# Patient Record
Sex: FEMALE | Race: BLACK OR AFRICAN AMERICAN | Employment: FULL TIME | URBAN - METROPOLITAN AREA
[De-identification: names, ages, dates, MRNs, and addresses within clinical notes are randomized per-mention and may not be internally consistent; named-entity substitution may affect disease eponyms.]

---

## 2018-06-18 ENCOUNTER — OFFICE VISIT (OUTPATIENT)
Dept: FAMILY MEDICINE CLINIC | Age: 58
End: 2018-06-18

## 2018-06-18 VITALS
HEART RATE: 57 BPM | HEIGHT: 63 IN | DIASTOLIC BLOOD PRESSURE: 72 MMHG | SYSTOLIC BLOOD PRESSURE: 126 MMHG | TEMPERATURE: 98.9 F | OXYGEN SATURATION: 96 % | BODY MASS INDEX: 31.89 KG/M2 | RESPIRATION RATE: 18 BRPM | WEIGHT: 180 LBS

## 2018-06-18 DIAGNOSIS — R06.02 SHORTNESS OF BREATH: Primary | ICD-10-CM

## 2018-06-18 DIAGNOSIS — I10 ESSENTIAL HYPERTENSION: ICD-10-CM

## 2018-06-18 DIAGNOSIS — M54.2 NECK PAIN: ICD-10-CM

## 2018-06-18 RX ORDER — ZINC GLUCONATE 10 MG
LOZENGE ORAL
COMMUNITY
End: 2019-11-14 | Stop reason: ALTCHOICE

## 2018-06-18 RX ORDER — BISMUTH SUBSALICYLATE 262 MG
1 TABLET,CHEWABLE ORAL DAILY
COMMUNITY

## 2018-06-18 NOTE — MR AVS SNAPSHOT
[address] 
[phone] Patient: Tom Thompson MRN: [id number] :1960 Visit Information Date & Time Provider Department Dept. Phone Encounter #  
 2018 11:00 AM Vinod Salguero  [facility] [phone] [id number] Upcoming Health Maintenance Date Due Hepatitis C Screening 1960 DTaP/Tdap/Td series (1 - Tdap) 1982 FOBT Q 1 YEAR AGE 50-75 2011 BREAST CANCER SCRN MAMMOGRAM 7/3/2018* Influenza Age 5 to Adult 2018 PAP AKA CERVICAL CYTOLOGY 2021 *Topic was postponed. The date shown is not the original due date. Allergies as of 2018  Review Complete On: 2018 By: Vinod Salguero NP No Known Allergies Current Immunizations  Never Reviewed Name Date PPD 2011 Tdap 2017 Not reviewed this visit You Were Diagnosed With   
  
 Codes Comments Shortness of breath    -  Primary ICD-10-CM: R06.02 
ICD-9-CM: 786.05 Neck pain     ICD-10-CM: M54.2 ICD-9-CM: 723.1 Vitals BP Pulse Temp Resp Height(growth percentile) Weight(growth percentile) 126/72 (BP 1 Location: Left arm, BP Patient Position: Sitting) (!) 57 98.9 °F (37.2 °C) (Oral) 18 5' 3\" (1.6 m) 180 lb (81.6 kg) SpO2 BMI OB Status Smoking Status 96% 31.89 kg/m2 Hysterectomy Never Smoker BMI and BSA Data Body Mass Index Body Surface Area  
 31.89 kg/m 2 1.9 m 2 Preferred Pharmacy Pharmacy Name Phone [facility] [address] [facility], [address] [facility],[address] Floor [phone] Your Updated Medication List  
  
   
This list is accurate as of 18 11:51 AM.  Always use your most recent med list.  
  
  
  
  
 EXCEDRIN MIGRAINE PO Take  by mouth as needed. hydroCHLOROthiazide 25 mg tablet Commonly known as:  HYDRODIURIL Take 1 Tab by mouth daily. magnesium 250 mg Tab Take  by mouth.  
  
 multivitamin tablet Commonly known as:  ONE A DAY Take 1 Tab by mouth daily. PROBIOTIC 4X 10-15 mg Tbec Generic drug:  B.infantis-B.ani-B.long-B.bifi Take  by mouth. To-Do List   
 06/18/2018 Imaging:  XR CHEST PA LAT Patient Instructions Healthy Upper Back: Exercises Your Care Instructions Here are some examples of exercises for your upper back. Start each exercise slowly. Ease off the exercise if you start to have pain. Your doctor or physical therapist will tell you when you can start these exercises and which ones will work best for you. How to do the exercises Lower neck and upper back stretch 1. Stretch your arms out in front of your body. Clasp one hand on top of your other hand. 2. Gently reach out so that you feel your shoulder blades stretching away from each other. 3. Gently bend your head forward. 4. Hold for 15 to 30 seconds. 5. Repeat 2 to 4 times. Midback stretch If you have knee pain, do not do this exercise. 1. Kneel on the floor, and sit back on your ankles. 2. Lean forward, place your hands on the floor, and stretch your arms out in front of you. Rest your head between your arms. 3. Gently push your chest toward the floor, reaching as far in front of you as possible. 4. Hold for 15 to 30 seconds. 5. Repeat 2 to 4 times. Shoulder rolls 1. Sit comfortably with your feet shoulder-width apart. You can also do this exercise while standing. 2. Roll your shoulders up, then back, and then down in a smooth, circular motion. 3. Repeat 2 to 4 times. Wall push-up 1. Stand against a wall with your feet about 12 to 24 inches back from the wall. If you feel any pain when you do this exercise, stand closer to the wall. 2. Place your hands on the wall slightly wider apart than your shoulders, and lean forward. 3. Gently lean your body toward the wall.  Then push back to your starting position. Keep the motion smooth and controlled. 4. Repeat 8 to 12 times. Resisted shoulder blade squeeze For this exercise, you will need elastic exercise material, such as surgical tubing or Thera-Band. 1. Sit or stand, holding the band in both hands in front of you. Keep your elbows close to your sides, bent at a 90-degree angle. Your palms should face up. 2. Squeeze your shoulder blades together, and move your arms to the outside, stretching the band. Be sure to keep your elbows at your sides while you do this. 3. Relax. 4. Repeat 8 to 12 times. Resisted rows For this exercise, you will need elastic exercise material, such as surgical tubing or Thera-Band. 1. Put the band around a solid object, such as a bedpost, at about waist level. Hold one end of the band in each hand. 2. With your elbows at your sides and bent to 90 degrees, pull the band back to move your shoulder blades toward each other. Return to the starting position. 3. Repeat 8 to 12 times. Follow-up care is a key part of your treatment and safety. Be sure to make and go to all appointments, and call your doctor if you are having problems. It's also a good idea to know your test results and keep a list of the medicines you take. Where can you learn more? Go to http://eladio-esther.info/. Enter H409 in the search box to learn more about \"Healthy Upper Back: Exercises. \" Current as of: March 21, 2017 Content Version: 11.4 © 8322-1789 Healthwise, Codeanywhere. Care instructions adapted under license by [employer] (which disclaims liability or warranty for this information). If you have questions about a medical condition or this instruction, always ask your healthcare professional. [address] any warranty or liability for your use of this information. Introducing [facility] & HE[facility]!    
 [facility] introduces BeautyStat.[email or web address] patient portal. Now you can access parts of your medical record, email your doctor's office, and request medication refills online. 1. In your internet browser, go to [email or web address] 2. Click on the First Time User? Click Here link in the Sign In box. You will see the New Member Sign Up page. 3. Enter your WP Fail-Safe Access Code exactly as it appears below. You will not need to use this code after youve completed the sign-up process. If you do not sign up before the expiration date, you must request a new code. · WP Fail-Safe Access Code: [email or web address] Expires: 9/16/2018 11:51 AM 
 
4. Enter the last four digits of your Social Security Number (xxxx) and Date of Birth (mm/dd/yyyy) as indicated and click Submit. You will be taken to the next sign-up page. 5. Create a WP Fail-Safe ID. This will be your WP Fail-Safe login ID and cannot be changed, so think of one that is secure and easy to remember. 6. Create a WP Fail-Safe password. You can change your password at any time. 7. Enter your Password Reset Question and Answer. This can be used at a later time if you forget your password. 8. Enter your e-mail address. You will receive e-mail notification when new information is available in [address] 9. Click Sign Up. You can now view and download portions of your medical record. 10. Click the Download Summary menu link to download a portable copy of your medical information. If you have questions, please visit the Frequently Asked Questions section of the WP Fail-Safe website. Remember, WP Fail-Safe is NOT to be used for urgent needs. For medical emergencies, dial 911. Now available from your iPhone and Android! Please provide this summary of care documentation to your next provider. Your primary care clinician is listed as Amaris Muñiz. If you have any questions after today's visit, please call [phone].

## 2018-06-18 NOTE — PROGRESS NOTES
HISTORY OF PRESENT ILLNESS  Barby Bronson is a 65 y.o. female. HPI  New patient to establish care. Reports she recently had CPE with labs this year with former PCP. Reports no significant abnormality. History of HTN. Adhering to medication regimen. C/o intermittent right side neck pain. Denies any recent injury or unusual/strenuous activity. Aggravated by right rotation. C/o intermittent SOB over the past few years. Sx occur randomly. No history of asthma, not association with exercise. No cough, fever. Review of chart shows she had chest xray done here 2 years ago revealing some RUL interstitial changes. Feels sx are slightly improving at this time. Past Medical History:   Diagnosis Date    Headache(784.0)     migraine/tension    Other and unspecified hyperlipidemia 5/09    high CRP    Unspecified essential hypertension      Patient Active Problem List   Diagnosis Code    Migraine G43.909    Mixed hyperlipidemia E78.2    Essential hypertension I10     Current Outpatient Prescriptions   Medication Sig    multivitamin (ONE A DAY) tablet Take 1 Tab by mouth daily.  magnesium 250 mg tab Take  by mouth.  B.infantis-B.ani-B.long-B.bifi (PROBIOTIC 4X) 10-15 mg TbEC Take  by mouth.  hydrochlorothiazide (HYDRODIURIL) 25 mg tablet Take 1 Tab by mouth daily.  ASPIRIN/ACETAMINOPHEN/CAFFEINE (EXCEDRIN MIGRAINE PO) Take  by mouth as needed. No current facility-administered medications for this visit. Social History   Substance Use Topics    Smoking status: Never Smoker    Smokeless tobacco: Never Used    Alcohol use No      Comment: rare     Visit Vitals    /72 (BP 1 Location: Left arm, BP Patient Position: Sitting)    Pulse (!) 57    Temp 98.9 °F (37.2 °C) (Oral)    Resp 18    Ht 5' 3\" (1.6 m)    Wt 180 lb (81.6 kg)    SpO2 96%    BMI 31.89 kg/m2     Review of Systems   Constitutional: Negative for chills, fever, malaise/fatigue and weight loss.    Respiratory: Positive for shortness of breath. Negative for cough, hemoptysis, sputum production and wheezing. Cardiovascular: Negative for chest pain, palpitations and leg swelling. Musculoskeletal: Positive for neck pain. All other systems reviewed and are negative. Physical Exam   Constitutional: No distress. Overweight. Neck: Neck supple. Cardiovascular: Normal rate, regular rhythm and normal heart sounds. Pulmonary/Chest: Effort normal and breath sounds normal. She has no wheezes. She has no rales. Abdominal: Soft. Bowel sounds are normal.   Musculoskeletal: She exhibits no edema. Cervical back: She exhibits tenderness (right cervical paraspinals). She exhibits normal range of motion. Lymphadenopathy:     She has no cervical adenopathy. Neurological: She is alert. Skin: Skin is warm and dry. Psychiatric: She has a normal mood and affect. ASSESSMENT and PLAN  Diagnoses and all orders for this visit:    1. Shortness of breath  -     XR CHEST PA LAT; Future  Repeat xray normal. Will monitor. Follow up if sx worsen or persist.  Recommend walking x 20 minutes daily. 2. Neck pain  Stretches as directed, handout given. Trial massage therapy. 3. BMI 31.0-31.9,adult  Weight loss recommendations given. 4. Essential hypertension  Well controlled. Follow up 6 months or sooner prn.

## 2018-06-18 NOTE — PROGRESS NOTES
Chief Complaint   Patient presents with    New Patient    Shortness of Breath     1. Have you been to the ER, urgent care clinic since your last visit? Hospitalized since your last visit? No    2. Have you seen or consulted any other health care providers outside of the [address] [place] [address] since your last visit? Include any pap smears or colon screening.  No

## 2018-06-18 NOTE — PATIENT INSTRUCTIONS
Healthy Upper Back: Exercises  Your Care Instructions  Here are some examples of exercises for your upper back. Start each exercise slowly. Ease off the exercise if you start to have pain. Your doctor or physical therapist will tell you when you can start these exercises and which ones will work best for you. How to do the exercises  Lower neck and upper back stretch    1. Stretch your arms out in front of your body. Clasp one hand on top of your other hand. 2. Gently reach out so that you feel your shoulder blades stretching away from each other. 3. Gently bend your head forward. 4. Hold for 15 to 30 seconds. 5. Repeat 2 to 4 times. Midback stretch    If you have knee pain, do not do this exercise. 1. Kneel on the floor, and sit back on your ankles. 2. Lean forward, place your hands on the floor, and stretch your arms out in front of you. Rest your head between your arms. 3. Gently push your chest toward the floor, reaching as far in front of you as possible. 4. Hold for 15 to 30 seconds. 5. Repeat 2 to 4 times. Shoulder rolls    1. Sit comfortably with your feet shoulder-width apart. You can also do this exercise while standing. 2. Roll your shoulders up, then back, and then down in a smooth, circular motion. 3. Repeat 2 to 4 times. Wall push-up    1. Stand against a wall with your feet about 12 to 24 inches back from the wall. If you feel any pain when you do this exercise, stand closer to the wall. 2. Place your hands on the wall slightly wider apart than your shoulders, and lean forward. 3. Gently lean your body toward the wall. Then push back to your starting position. Keep the motion smooth and controlled. 4. Repeat 8 to 12 times. Resisted shoulder blade squeeze    For this exercise, you will need elastic exercise material, such as surgical tubing or Thera-Band. 1. Sit or stand, holding the band in both hands in front of you.  Keep your elbows close to your sides, bent at a 90-degree angle. Your palms should face up. 2. Squeeze your shoulder blades together, and move your arms to the outside, stretching the band. Be sure to keep your elbows at your sides while you do this. 3. Relax. 4. Repeat 8 to 12 times. Resisted rows    For this exercise, you will need elastic exercise material, such as surgical tubing or Thera-Band. 1. Put the band around a solid object, such as a bedpost, at about waist level. Hold one end of the band in each hand. 2. With your elbows at your sides and bent to 90 degrees, pull the band back to move your shoulder blades toward each other. Return to the starting position. 3. Repeat 8 to 12 times. Follow-up care is a key part of your treatment and safety. Be sure to make and go to all appointments, and call your doctor if you are having problems. It's also a good idea to know your test results and keep a list of the medicines you take. Where can you learn more? Go to http://eladio-esther.info/. Enter O679 in the search box to learn more about \"Healthy Upper Back: Exercises. \"  Current as of: March 21, 2017  Content Version: 11.4  © 0419-5010 Healthwise, Incorporated. Care instructions adapted under license by [employer] (which disclaims liability or warranty for this information). If you have questions about a medical condition or this instruction, always ask your healthcare professional. [place] [address] any warranty or liability for your use of this information.

## 2018-07-09 ENCOUNTER — TELEPHONE (OUTPATIENT)
Dept: FAMILY MEDICINE CLINIC | Age: 58
End: 2018-07-09

## 2018-07-16 ENCOUNTER — OFFICE VISIT (OUTPATIENT)
Dept: FAMILY MEDICINE CLINIC | Age: 58
End: 2018-07-16

## 2018-07-16 VITALS
SYSTOLIC BLOOD PRESSURE: 124 MMHG | HEART RATE: 60 BPM | OXYGEN SATURATION: 96 % | BODY MASS INDEX: 32 KG/M2 | TEMPERATURE: 98 F | HEIGHT: 63 IN | WEIGHT: 180.6 LBS | RESPIRATION RATE: 16 BRPM | DIASTOLIC BLOOD PRESSURE: 74 MMHG

## 2018-07-16 DIAGNOSIS — I10 ESSENTIAL HYPERTENSION: ICD-10-CM

## 2018-07-16 DIAGNOSIS — E78.2 MIXED HYPERLIPIDEMIA: ICD-10-CM

## 2018-07-16 DIAGNOSIS — E66.9 OBESITY (BMI 30.0-34.9): ICD-10-CM

## 2018-07-16 DIAGNOSIS — M54.2 NECK PAIN: Primary | ICD-10-CM

## 2018-07-16 RX ORDER — METHOCARBAMOL 500 MG/1
500 TABLET, FILM COATED ORAL 4 TIMES DAILY
Qty: 30 TAB | Refills: 0 | Status: SHIPPED | OUTPATIENT
Start: 2018-07-16 | End: 2019-01-28 | Stop reason: ALTCHOICE
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
## 2018-07-16 NOTE — LETTER
NOTIFICATION RETURN TO WORK / SCHOOL 
 
7/16/2018 10:46 AM 
 
Ms. Tiffanie Thornton Yosefda. [address] [address] To Whom It May Concern: 
 
Tiffanie Thornton is currently under the care of [facility]. No heavy lifting for two weeks until next ain resolved If there are questions or concerns please have the patient contact our office. Sincerely, Mariela Hoyt NP

## 2018-07-16 NOTE — LETTER
NOTIFICATION RETURN TO WORK / SCHOOL 
 
7/16/2018 10:49 AM 
 
Ms. Christal Hendricks Usama. [address] [address] To Whom It May Concern: 
 
Christal Hendricks is currently under the care of [facility]. She will avoid heavy lifting x 2 weeks If there are questions or concerns please have the patient contact our office. Sincerely, Magdalene Hawkins NP

## 2018-07-16 NOTE — MR AVS SNAPSHOT
[facility] Moreno 
 
 
 [address] [facility] [address] [place] [address] 
[phone] Patient: Douglas Suresh MRN: [id number] :1960 Visit Information Date & Time Provider Department Dept. Phone Encounter #  
 2018 10:15 AM [facility], [address] [phone] [id number] Upcoming Health Maintenance Date Due Hepatitis C Screening 1961 FOBT Q 1 YEAR AGE 50-75 2011 BREAST CANCER SCRN MAMMOGRAM 2014 Influenza Age 5 to Adult 2018 PAP AKA CERVICAL CYTOLOGY 2021 DTaP/Tdap/Td series (2 - Td) 2027 Allergies as of 2018  Review Complete On: 2018 By: Darron Burkitt, LPN No Known Allergies Current Immunizations  Never Reviewed Name Date PPD 2011 Tdap 2017 Not reviewed this visit You Were Diagnosed With   
  
 Codes Comments Neck pain    -  Primary ICD-10-CM: M54.2 ICD-9-CM: 723.1 Essential hypertension     ICD-10-CM: I10 
ICD-9-CM: 401.9 Mixed hyperlipidemia     ICD-10-CM: E78.2 ICD-9-CM: 272.2 Vitals BP Pulse Temp Resp Height(growth percentile) Weight(growth percentile) 124/74 60 98 °F (36.7 °C) (Oral) 16 5' 3\" (1.6 m) 180 lb 9.6 oz (81.9 kg) SpO2 BMI OB Status Smoking Status 96% 31.99 kg/m2 Hysterectomy Never Smoker Vitals History BMI and BSA Data Body Mass Index Body Surface Area 31.99 kg/m 2 1.91 m 2 Preferred Pharmacy Pharmacy Name Phone [facility] [address] [facility], [address] [facility] [phone] Your Updated Medication List  
  
   
This list is accurate as of 18 10:45 AM.  Always use your most recent med list.  
  
  
  
  
 Hammad Alexandre Take  by mouth as needed. hydroCHLOROthiazide 25 mg tablet Commonly known as:  HYDRODIURIL Take 1 Tab by mouth daily. magnesium 250 mg Tab Take  by mouth. methocarbamol 500 mg tablet Commonly known as:  ROBAXIN Take 1 Tab by mouth four (4) times daily. multivitamin tablet Commonly known as:  ONE A DAY Take 1 Tab by mouth daily. PROBIOTIC 4X 10-15 mg Tbec Generic drug:  B.infantis-B.ani-B.long-B.bifi Take  by mouth. Prescriptions Sent to Pharmacy Refills  
 methocarbamol (ROBAXIN) 500 mg tablet 0 Sig: Take 1 Tab by mouth four (4) times daily. Class: Normal  
 Pharmacy: [facility] [address] NICO COOPER [address] [place] [address], [address] [place] [address],[address] Ph #: [phone] Route: Oral  
  
We Performed the Following CBC W/O DIFF [05210 CPT(R)] LIPID PANEL [02293 CPT(R)] METABOLIC PANEL, COMPREHENSIVE [77817 CPT(R)] Introducing [facility]! [facility]: Thank you for requesting a [employer] account. Our records indicate that you already have an active [employer] account. You can access your account anytime at [email or web address] Did you know that you can access your hospital and ER discharge instructions at any time in [employer]? You can also review all of your test results from your hospital stay or ER visit. Additional Information If you have questions, please visit the Frequently Asked Questions section of the [employer] website at [email or web address] Remember, [employer] is NOT to be used for urgent needs. For medical emergencies, dial 911. Now available from your iPhone and Android! Please provide this summary of care documentation to your next provider. Your primary care clinician is listed as Oscar Mederos. If you have any questions after today's visit, please call [phone].

## 2018-09-04 ENCOUNTER — OFFICE VISIT (OUTPATIENT)
Dept: FAMILY MEDICINE CLINIC | Age: 58
End: 2018-09-04

## 2018-09-04 ENCOUNTER — TELEPHONE (OUTPATIENT)
Dept: FAMILY MEDICINE CLINIC | Age: 58
End: 2018-09-04

## 2018-09-04 VITALS
SYSTOLIC BLOOD PRESSURE: 100 MMHG | HEART RATE: 76 BPM | OXYGEN SATURATION: 98 % | WEIGHT: 176 LBS | HEIGHT: 63 IN | BODY MASS INDEX: 31.18 KG/M2 | DIASTOLIC BLOOD PRESSURE: 56 MMHG | TEMPERATURE: 98.3 F | RESPIRATION RATE: 16 BRPM

## 2018-09-04 DIAGNOSIS — R91.8 OPACITY OF LUNG ON IMAGING STUDY: ICD-10-CM

## 2018-09-04 DIAGNOSIS — R31.9 HEMATURIA, UNSPECIFIED TYPE: ICD-10-CM

## 2018-09-04 DIAGNOSIS — I10 ESSENTIAL HYPERTENSION: ICD-10-CM

## 2018-09-04 DIAGNOSIS — Z09 HOSPITAL DISCHARGE FOLLOW-UP: Primary | ICD-10-CM

## 2018-09-04 DIAGNOSIS — N30.01 ACUTE CYSTITIS WITH HEMATURIA: ICD-10-CM

## 2018-09-04 RX ORDER — CEPHALEXIN 500 MG/1
CAPSULE ORAL
COMMUNITY
Start: 2018-09-03 | End: 2019-01-28 | Stop reason: ALTCHOICE

## 2018-09-04 RX ORDER — CYCLOBENZAPRINE HCL 10 MG
TABLET ORAL
COMMUNITY
Start: 2018-07-02 | End: 2019-01-28 | Stop reason: ALTCHOICE

## 2018-09-04 RX ORDER — IBUPROFEN 600 MG/1
TABLET ORAL
COMMUNITY
Start: 2018-07-02 | End: 2019-01-28 | Stop reason: ALTCHOICE

## 2018-09-04 RX ORDER — SULFAMETHOXAZOLE AND TRIMETHOPRIM 800; 160 MG/1; MG/1
TABLET ORAL
Refills: 0 | COMMUNITY
Start: 2018-09-02 | End: 2019-01-28 | Stop reason: ALTCHOICE

## 2018-09-04 NOTE — MR AVS SNAPSHOT
[address] 
[phone] Patient: Catalina Brandon MRN: [id number] :1960 Visit Information Date & Time Provider Department Dept. Phone Encounter #  
 2018  2:30 PM Marija Mixon MD [address] [place] [address] [phone] [id number] Follow-up Instructions Return if symptoms worsen or fail to improve, for Follow Up. Upcoming Health Maintenance Date Due Hepatitis C Screening 1960 FOBT Q 1 YEAR AGE 50-75 2011 BREAST CANCER SCRN MAMMOGRAM 2014 Influenza Age 5 to Adult 3/31/2019* PAP AKA CERVICAL CYTOLOGY 2021 DTaP/Tdap/Td series (2 - Td) 2027 *Topic was postponed. The date shown is not the original due date. Allergies as of 2018  Review Complete On: 2018 By: Shital Bunn LPN No Known Allergies Current Immunizations  Never Reviewed Name Date PPD 2011 Tdap 2017 Not reviewed this visit You Were Diagnosed With   
  
 Codes Comments Hospital discharge follow-up    -  Primary ICD-10-CM: [facility] ICD-9-CM: V67.59 Essential hypertension     ICD-10-CM: I10 
ICD-9-CM: 401.9 Hematuria, unspecified type     ICD-10-CM: R31.9 ICD-9-CM: 599.70 Opacity of lung on imaging study     ICD-10-CM: R91.8 ICD-9-CM: 793.19 Acute cystitis with hematuria     ICD-10-CM: N30.01 
ICD-9-CM: 595.0 Vitals BP Pulse Temp Resp Height(growth percentile) Weight(growth percentile) 100/56 (BP 1 Location: Left arm, BP Patient Position: Sitting) 76 98.3 °F (36.8 °C) (Oral) 16 5' 3\" (1.6 m) 176 lb (79.8 kg) SpO2 BMI OB Status Smoking Status 98% 31.18 kg/m2 Hysterectomy Never Smoker BMI and BSA Data Body Mass Index Body Surface Area  
 31.18 kg/m 2 1.88 m 2 Preferred Pharmacy Pharmacy Name Phone  [facility] [facility], [address] [facility],[address] [phone] Your Updated Medication List  
  
   
This list is accurate as of 9/4/18  3:21 PM.  Always use your most recent med list.  
  
  
  
  
 cephALEXin 500 mg capsule Commonly known as:  KEFLEX  
  
 cyclobenzaprine 10 mg tablet Commonly known as:  FLEXERIL  
  
 EXCEDRIN MIGRAINE PO Take  by mouth as needed. hydroCHLOROthiazide 25 mg tablet Commonly known as:  HYDRODIURIL Take 1 Tab by mouth daily. ibuprofen 600 mg tablet Commonly known as:  MOTRIN  
  
 magnesium 250 mg Tab Take  by mouth. methocarbamol 500 mg tablet Commonly known as:  ROBAXIN Take 1 Tab by mouth four (4) times daily. multivitamin tablet Commonly known as:  ONE A DAY Take 1 Tab by mouth daily. PROBIOTIC 4X 10-15 mg Tbec Generic drug:  B.infantis-B.ani-B.long-B.bifi Take  by mouth. trimethoprim-sulfamethoxazole 160-800 mg per tablet Commonly known as:  BACTRIM DS, SEPTRA DS  
TAKE 1 TABLET BY MOUTH EVERY 12 HOURS Follow-up Instructions Return if symptoms worsen or fail to improve, for Follow Up. To-Do List   
 09/04/2018 Imaging:  CT CHEST WO CONT Patient Instructions You may have a urinary tract infection. Please continue your antibiotic. Call or return to clinic if you do not feel better and without a fever after taking these. Eat 3 healthy meals daily and drink at least 2L water daily as you recover. Call your pharmacy about the hydrochlorothiazide medication to ask if your was on recall list.  
 
 
 
  
Blood in the Urine: Care Instructions Your Care Instructions Blood in the urine, or hematuria, may make the urine look red, brown, or pink. There may be blood every time you urinate or just from time to time. You cannot always see blood in the urine, but it will show up in a urine test. 
Blood in the urine may be serious.  It should always be checked by a doctor. Your doctor may recommend more tests, including an X-ray, a CT scan, or a cystoscopy (which lets a doctor look inside the urethra and bladder). Blood in the urine can be a sign of another problem. Common causes are bladder infections and kidney stones. An injury to your groin or your genital area can also cause bleeding in the urinary tract. Very hard exercise-such as running a marathon-can cause blood in the urine. Blood in the urine can also be a sign of kidney disease or cancer in the bladder or kidney. Many cases of blood in the urine are caused by a harmless condition that runs in families. This is called benign familial hematuria. It does not need any treatment. Sometimes your urine may look red or brown even though it does not contain blood. For example, not getting enough fluids (dehydration), taking certain medicines, or having a liver problem can change the color of your urine. Eating foods such as beets, rhubarb, or blackberries or foods with red food coloring can make your urine look red or pink. Follow-up care is a key part of your treatment and safety. Be sure to make and go to all appointments, and call your doctor if you are having problems. It's also a good idea to know your test results and keep a list of the medicines you take. When should you call for help? Call your doctor now or seek immediate medical care if: 
  · You have symptoms of a urinary infection. For example: ¨ You have pus in your urine. ¨ You have pain in your back just below your rib cage. This is called flank pain. ¨ You have a fever, chills, or body aches. ¨ It hurts to urinate. ¨ You have groin or belly pain.  
  · You have more blood in your urine.  
 Watch closely for changes in your health, and be sure to contact your doctor if: 
  · You have new urination problems.  
  · You do not get better as expected. Where can you learn more? Go to http://eladio-esther.info/. Enter G032 in the search box to learn more about \"Blood in the Urine: Care Instructions. \" Current as of: May 12, 2017 Content Version: 11.7 © 0428-0548 boolino. Care instructions adapted under license by [employer] (which disclaims liability or warranty for this information). If you have questions about a medical condition or this instruction, always ask your healthcare professional. [address] any warranty or liability for your use of this information. Introducing [facility] SERVICES! Dear Andres Foy: Thank you for requesting a Canvace account. Our records indicate that you already have an active Canvace account. You can access your account anytime at [email or web address] Did you know that you can access your hospital and ER discharge instructions at any time in Canvace? You can also review all of your test results from your hospital stay or ER visit. Additional Information If you have questions, please visit the Frequently Asked Questions section of the Canvace website at [email or web address] Remember, Canvace is NOT to be used for urgent needs. For medical emergencies, dial 911. Now available from your iPhone and Android! Please provide this summary of care documentation to your next provider. Your primary care clinician is listed as Trip Fisher. If you have any questions after today's visit, please call [phone].

## 2018-09-04 NOTE — PATIENT INSTRUCTIONS
You may have a urinary tract infection. Please continue your antibiotic. Call or return to clinic if you do not feel better and without a fever after taking these. Eat 3 healthy meals daily and drink at least 2L water daily as you recover. Call your pharmacy about the hydrochlorothiazide medication to ask if your was on recall list.  
 
 
 
  

## 2018-09-04 NOTE — PROGRESS NOTES
[address] [facility] Clinic Note Subjective: Chief Complaint Patient presents with  Transitions Of Care  
  pt discharged from [facility] on 9/2/18 for kidney stone. pt states she passed the stone.  Fever  
  pt states she started Bactrim and took one dose and started with fever and chills. pt ststes she stopped the medication Radha Lara is a 65y.o. year old female who presents for evaluation of the following: 
 
 
ER Follow Up 
9/2/18 Hematuria - Blood with wiping and trace blood seen on urinalysis in ER 
- Associated left flank pain Tx: keflex started yesteday - Stopped bactrim due to stomach upset Tmax 101 last night, headache and poor appetite No pain since discharge Has not seen any blood in urine since discharge Headache improved with ibuprofen 600mg every 6 hours Lung Nodule 9mm RLL on CT scan abdomen in ER Non smoker Endorses smoke exposure as a child Denies cough, coughing up blood, chest pain, unintentional weight loss HTN: 
Tx: HTCZ, taking daily Medication may have been in recall list 
 
Cholelithiasis Without bilarry ductal dilation on CT abd pelvis No RUQ pain, nausea or vomiting Review of Systems Pertinent positives and negative per HPI. All other systems  reviewed are negative for a Comprehensive ROS (10+). Past Medical History:  
Diagnosis Date  Headache(784.0)   
 migraine/tension  Other and unspecified hyperlipidemia 5/09  
 high CRP  
 Unspecified essential hypertension Social History Social History  Marital status:  Spouse name: N/A  
 Number of children: N/A  
 Years of education: N/A Occupational History   Social History Main Topics  Smoking status: Never Smoker  Smokeless tobacco: Never Used  Alcohol use No  
   Comment: rare  Drug use: No  
 Sexual activity: Not on file Other Topics Concern  Exercise Yes  
  walking Social History Narrative Current Outpatient Prescriptions Medication Sig  cephALEXin (KEFLEX) 500 mg capsule  ibuprofen (MOTRIN) 600 mg tablet  multivitamin (ONE A DAY) tablet Take 1 Tab by mouth daily.  magnesium 250 mg tab Take  by mouth.  B.infantis-B.ani-B.long-B.bifi (PROBIOTIC 4X) 10-15 mg TbEC Take  by mouth.  hydrochlorothiazide (HYDRODIURIL) 25 mg tablet Take 1 Tab by mouth daily.  ASPIRIN/ACETAMINOPHEN/CAFFEINE (EXCEDRIN MIGRAINE PO) Take  by mouth as needed.  cyclobenzaprine (FLEXERIL) 10 mg tablet  trimethoprim-sulfamethoxazole (BACTRIM DS, SEPTRA DS) 160-800 mg per tablet TAKE 1 TABLET BY MOUTH EVERY 12 HOURS  methocarbamol (ROBAXIN) 500 mg tablet Take 1 Tab by mouth four (4) times daily. No current facility-administered medications for this visit. Objective:  
 
Vitals:  
 09/04/18 1449 BP: 100/56 Pulse: 76 Resp: 16 Temp: 98.3 °F (36.8 °C) TempSrc: Oral  
SpO2: 98% Weight: 176 lb (79.8 kg) Height: 5' 3\" (1.6 m) Physical Examination: 
General: Alert, cooperative, no distress, appears stated age. Eyes: Conjunctivae clear. PERRL, EOMs intact. Ears: Normal external ear canals both ears. Nose: Nares normal. Septum midline. Mucosa normal. No drainage or sinus tenderness. Mouth/Throat: Lips, mucosa, and tongue normal.  
Neck: Supple, symmetrical, trachea midline, no adenopathy. No thyroid enlargement/tenderness/nodules Back: Symmetric, no curvature. ROM normal. No CVA tenderness. Lungs: Clear to auscultation bilaterally. Normal inspiratory and expiratory ratio. Heart: Regular rate and rhythm, S1, S2 normal, no murmur, click, rub or gallop. Abdomen: Soft, non-tender. Bowel sounds normal. No masses or organomegaly. Extremities: Extremities normal, atraumatic, no cyanosis or edema. Pulses: 2+ and symmetric all extremities. Skin: Skin color, texture, turgor normal. No rashes or lesions on exposed skin. Lymph nodes: Cervical, supraclavicular nodes normal. 
Neurologic: CNII-XII intact. No visits with results within 3 Month(s) from this visit. Latest known visit with results is: 
 
Admission on 06/27/2015, Discharged on 06/27/2015 Component Date Value Ref Range Status  WBC 06/27/2015 6.3  3.6 - 11.0 K/uL Final  
 RBC 06/27/2015 4.93  3.80 - 5.20 M/uL Final  
 HGB 06/27/2015 14.1  11.5 - 16.0 g/dL Final  
 HCT 06/27/2015 42.2  35.0 - 47.0 % Final  
 MCV 06/27/2015 85.6  80.0 - 99.0 FL Final  
 MCH 06/27/2015 28.6  26.0 - 34.0 PG Final  
 MCHC 06/27/2015 33.4  30.0 - 36.5 g/dL Final  
 RDW 06/27/2015 14.0  11.5 - 14.5 % Final  
 PLATELET 23/91/7081 458  150 - 400 K/uL Final  
 NEUTROPHILS 06/27/2015 80* 32 - 75 % Final  
 BAND NEUTROPHILS 06/27/2015 8* 0 - 6 % Final  
 LYMPHOCYTES 06/27/2015 7* 12 - 49 % Final  
 MONOCYTES 06/27/2015 3* 5 - 13 % Final  
 EOSINOPHILS 06/27/2015 0  0 - 7 % Final  
 BASOPHILS 06/27/2015 2* 0 - 1 % Final  
 ABS. NEUTROPHILS 06/27/2015 5.6  1.8 - 8.0 K/UL Final  
 ABS. LYMPHOCYTES 06/27/2015 0.4* 0.8 - 3.5 K/UL Final  
 ABS. MONOCYTES 06/27/2015 0.2  0.0 - 1.0 K/UL Final  
 ABS. EOSINOPHILS 06/27/2015 0.0  0.0 - 0.4 K/UL Final  
 ABS.  BASOPHILS 06/27/2015 0.1  0.0 - 0.1 K/UL Final  
 DF 06/27/2015 MANUAL    Final  
 RBC COMMENTS 06/27/2015 NORMOCYTIC, NORMOCHROMIC    Final  
 Sodium 06/27/2015 136  136 - 145 mmol/L Final  
 Potassium 06/27/2015 3.8  3.5 - 5.1 mmol/L Final  
 Chloride 06/27/2015 102  97 - 108 mmol/L Final  
 CO2 06/27/2015 25  21 - 32 mmol/L Final  
 Anion gap 06/27/2015 9  5 - 15 mmol/L Final  
 Glucose 06/27/2015 102* 65 - 100 mg/dL Final  
 BUN 06/27/2015 12  6 - 20 MG/DL Final  
 Creatinine 06/27/2015 0.74  0.45 - 1.15 MG/DL Final  
 BUN/Creatinine ratio 06/27/2015 16  12 - 20   Final  
 GFR est AA 06/27/2015 >60  >60 ml/min/1.73m2 Final  
 GFR est non-AA 06/27/2015 >60  >60 ml/min/1.73m2 Final  
 Comment: Estimated GFR is calculated using the IDMS-traceable Modification of Diet in Renal Disease (MDRD) Study equation, reported for both  Americans (GFRAA) and non- Americans (GFRNA), and normalized to 1.73m2 body surface area. The physician must decide which value applies to the patient. The MDRD study equation should only be used in individuals age 25 or older. It has not been validated for the following: pregnant women, patients with serious comorbid conditions, or on certain medications, or persons with extremes of body size, muscle mass, or nutritional status.  Calcium 06/27/2015 8.4* 8.5 - 10.1 MG/DL Final  
 Bilirubin, total 06/27/2015 0.3  0.2 - 1.0 MG/DL Final  
 ALT (SGPT) 06/27/2015 26  12 - 78 U/L Final  
 AST (SGOT) 06/27/2015 25  15 - 37 U/L Final  
 Alk. phosphatase 06/27/2015 89  45 - 117 U/L Final  
 Protein, total 06/27/2015 8.0  6.4 - 8.2 g/dL Final  
 Albumin 06/27/2015 3.5  3.5 - 5.0 g/dL Final  
 Globulin 06/27/2015 4.5* 2.0 - 4.0 g/dL Final  
 A-G Ratio 06/27/2015 0.8* 1.1 - 2.2   Final  
 Color 06/27/2015 YELLOW/STRAW    Final  
 Color Reference Range: Straw, Yellow or Dark Yellow  Appearance 06/27/2015 CLEAR  CLEAR   Final  
 Specific gravity 06/27/2015 1.014  1.003 - 1.030   Final  
 pH (UA) 06/27/2015 5.5  5.0 - 8.0   Final  
 Protein 06/27/2015 NEGATIVE   NEG mg/dL Final  
 Glucose 06/27/2015 NEGATIVE   NEG mg/dL Final  
 Ketone 06/27/2015 NEGATIVE   NEG mg/dL Final  
 Bilirubin 06/27/2015 NEGATIVE   NEG   Final  
 Blood 06/27/2015 MODERATE* NEG   Final  
 Urobilinogen 06/27/2015 0.2  0.2 - 1.0 EU/dL Final  
 Nitrites 06/27/2015 NEGATIVE   NEG   Final  
 Leukocyte Esterase 06/27/2015 NEGATIVE   NEG   Final  
 WBC 06/27/2015 0-4  0 - 4 /hpf Final  
 RBC 06/27/2015 5-10  0 - 5 /hpf Final  
 Epithelial cells 06/27/2015 MODERATE* FEW /lpf Final  
 Bacteria 06/27/2015 1+* NEG /hpf Final  
 Budding yeast 06/27/2015 PRESENT* NEG   Final  
  Lactic acid 06/27/2015 0.8  0.4 - 2.0 MMOL/L Final  
 Special Requests: 06/27/2015 NO SPECIAL REQUESTS    Final  
 Culture result: 06/27/2015 NO GROWTH 5 DAYS    Final  
 
 
 
 
Assessment/ Plan:  
Diagnoses and all orders for this visit: 1. Hospital discharge follow-up 2. Essential hypertension 3. Hematuria, unspecified type 4. Opacity of lung on imaging study 
-     CT CHEST WO CONT; Future 5. Acute cystitis with hematuria ER visit for hematuria. Likely cause by UTI vs kidney stone although CT scan without any acute abnormality. Recommend complete abx course and RTC if not improved. Adequate hydration and nutrition while recovering. BP well controlled. Noted baseline BP low normal with few values available in past 1 year for comparison. Follow up with pharmacy about HCTZ recall. Noted patient tab 25mg and recall list is for 12.5g tabs per FDA website. Lung opacity seen on CT abdomen. Will get CT chest to evaluate. Low risk malignancy. Educated patient on red flag symptoms to warrant return to clinic or emergency room visit. I have discussed the diagnosis with the patient and the intended plan as seen in the above orders. The patient has been offered or received an after-visit summary and questions were answered concerning future plans. I have discussed medication side effects and warnings with the patient as well. Follow-up Disposition: 
Return if symptoms worsen or fail to improve, for Follow Up. Signed, Mariam Shane MD 
9/4/2018

## 2018-09-04 NOTE — LETTER
NOTIFICATION RETURN TO WORK / SCHOOL 
 
9/4/2018 3:23 PM 
 
Ms. Douglas Suresh Usama. [address] [address] To Whom It May Concern: 
 
Douglas Suresh is currently under the care of [facility]. She will return to work/school on: 9/6/18 If there are questions or concerns please have the patient contact our office. Sincerely, Donavan Rajput MD

## 2018-09-04 NOTE — PROGRESS NOTES
Chief Complaint Patient presents with  Transitions Of Care  
  pt discharged from [address] on 9/2/18 for kidney stone. pt states she passed the stone.  Fever  
  pt states she started Bactrim and took one dose and started with fever and chills. pt ststes she stopped the medication \"REVIEWED RECORD IN PREPARATION FOR VISIT AND HAVE OBTAINED THE NECESSARY DOCUMENTATION\" 1. Have you been to the ER, urgent care clinic since your last visit? Hospitalized since your last visit? No 
 
2. Have you seen or consulted any other health care providers outside of the [address] [place] [address] since your last visit? Include any pap smears or colon screening. Yes Where: see above

## 2018-09-04 NOTE — TELEPHONE ENCOUNTER
Outbound call to patient. No answer left message on name confirmed voicemail. Trying to find out what hospital patient was at to obtain records.

## 2018-09-10 ENCOUNTER — HOSPITAL ENCOUNTER (OUTPATIENT)
Dept: CT IMAGING | Age: 58
Discharge: HOME | End: 2018-09-10
Payer: COMMERCIAL

## 2018-09-10 DIAGNOSIS — R93.89 ABNORMAL CT SCAN, CHEST: ICD-10-CM

## 2018-09-10 DIAGNOSIS — R91.1 LUNG NODULE: Primary | ICD-10-CM

## 2018-09-10 DIAGNOSIS — R91.8 OPACITY OF LUNG ON IMAGING STUDY: ICD-10-CM

## 2018-09-10 PROCEDURE — 71250 CT THORAX DX C-: CPT

## 2018-09-10 NOTE — PROGRESS NOTES
Notify Patient:   Your chest CT scan showed several abnormal findings including the small lung nodule and signs of chronic lung disease. I recommend you follow up with a lung specialist, to clarify the significance of these. I will order a referral for this.

## 2018-09-11 ENCOUNTER — TELEPHONE (OUTPATIENT)
Dept: FAMILY MEDICINE CLINIC | Age: 58
End: 2018-09-11

## 2018-09-11 NOTE — TELEPHONE ENCOUNTER
----- Message from Elenita Guevara sent at 9/11/2018  2:22 PM EDT -----  Regarding: Dr. Delmis Ayers  Pt is requesting a call back from Dr. Zita Easton in reference to CT scan results. Pt best contact [phone].

## 2018-09-11 NOTE — TELEPHONE ENCOUNTER
Outbound call to patient. Name and  verified. Reviewed recent CT results with patient which she had reviewed on my chart.  Informed patient of providers information for referral.

## 2018-09-28 ENCOUNTER — OFFICE VISIT (OUTPATIENT)
Dept: FAMILY MEDICINE CLINIC | Age: 58
End: 2018-09-28

## 2018-09-28 VITALS
OXYGEN SATURATION: 98 % | HEIGHT: 63 IN | WEIGHT: 176.6 LBS | TEMPERATURE: 98 F | HEART RATE: 60 BPM | DIASTOLIC BLOOD PRESSURE: 80 MMHG | BODY MASS INDEX: 31.29 KG/M2 | RESPIRATION RATE: 18 BRPM | SYSTOLIC BLOOD PRESSURE: 134 MMHG

## 2018-09-28 DIAGNOSIS — R91.1 LUNG NODULE: ICD-10-CM

## 2018-09-28 DIAGNOSIS — R93.89 ABNORMAL CT SCAN, CHEST: ICD-10-CM

## 2018-09-28 DIAGNOSIS — R31.9 HEMATURIA, UNSPECIFIED TYPE: Primary | ICD-10-CM

## 2018-09-28 RX ORDER — FLUTICASONE PROPIONATE 50 MCG
SPRAY, SUSPENSION (ML) NASAL
COMMUNITY
Start: 2018-09-17 | End: 2021-08-09 | Stop reason: SDUPTHER

## 2018-09-28 NOTE — PATIENT INSTRUCTIONS
Blood in the Urine: Care Instructions Your Care Instructions Blood in the urine, or hematuria, may make the urine look red, brown, or pink. There may be blood every time you urinate or just from time to time. You cannot always see blood in the urine, but it will show up in a urine test. 
Blood in the urine may be serious. It should always be checked by a doctor. Your doctor may recommend more tests, including an X-ray, a CT scan, or a cystoscopy (which lets a doctor look inside the urethra and bladder). Blood in the urine can be a sign of another problem. Common causes are bladder infections and kidney stones. An injury to your groin or your genital area can also cause bleeding in the urinary tract. Very hard exercise-such as running a marathon-can cause blood in the urine. Blood in the urine can also be a sign of kidney disease or cancer in the bladder or kidney. Many cases of blood in the urine are caused by a harmless condition that runs in families. This is called benign familial hematuria. It does not need any treatment. Sometimes your urine may look red or brown even though it does not contain blood. For example, not getting enough fluids (dehydration), taking certain medicines, or having a liver problem can change the color of your urine. Eating foods such as beets, rhubarb, or blackberries or foods with red food coloring can make your urine look red or pink. Follow-up care is a key part of your treatment and safety. Be sure to make and go to all appointments, and call your doctor if you are having problems. It's also a good idea to know your test results and keep a list of the medicines you take. When should you call for help? Call your doctor now or seek immediate medical care if: 
  · You have symptoms of a urinary infection. For example: ¨ You have pus in your urine. ¨ You have pain in your back just below your rib cage. This is called flank pain. ¨ You have a fever, chills, or body aches. ¨ It hurts to urinate. ¨ You have groin or belly pain.  
  · You have more blood in your urine.  
 Watch closely for changes in your health, and be sure to contact your doctor if: 
  · You have new urination problems.  
  · You do not get better as expected. Where can you learn more? Go to http://eladio-esther.info/. Enter X219 in the search box to learn more about \"Blood in the Urine: Care Instructions. \" Current as of: May 12, 2017 Content Version: 11.7 © 9594-6234 [employer]. Care instructions adapted under license by [employer] (which disclaims liability or warranty for this information). If you have questions about a medical condition or this instruction, always ask your healthcare professional. [address] any warranty or liability for your use of this information.

## 2018-09-28 NOTE — PROGRESS NOTES
[facility] Clinic Note Subjective: Chief Complaint Patient presents with  Blood in Urine  
  patient states she sometimes notices light red blood after urinating. pt recently had kidney stone Rojas Jeff is a 65y.o. year old female who presents for evaluation of the following: 
 
Hematuria: 
Recent ER visit 92/18 for hematuria thought to be 2/2 kidney stone. Blood in urine is lighter than previous Completed course of antibiotics Dusrina, inmplete, 
  
Lung Nodule/ Abnormal CT Scan:  
Patient requests discussion of CT scan results CT scan doneto evaluate incidental lung nodules see on CT chest 
Endorses occasional shortness of breath that does not limit activity and not clearly exertional.  
No current SOB, chest pain, hemoptysis. Review of Systems Pertinent positives and negative per HPI. All other systems  reviewed are negative for a Comprehensive ROS (10+). Past Medical History:  
Diagnosis Date  Headache(784.0)   
 migraine/tension  Other and unspecified hyperlipidemia 5/09  
 high CRP  
 Unspecified essential hypertension Social History Social History  Marital status:  Spouse name: N/A  
 Number of children: N/A  
 Years of education: N/A Occupational History   Social History Main Topics  Smoking status: Never Smoker  Smokeless tobacco: Never Used  Alcohol use No  
   Comment: rare  Drug use: No  
 Sexual activity: Not on file Other Topics Concern  Exercise Yes  
  walking Social History Narrative Current Outpatient Prescriptions Medication Sig  
 fluticasone (FLONASE) 50 mcg/actuation nasal spray  ibuprofen (MOTRIN) 600 mg tablet  multivitamin (ONE A DAY) tablet Take 1 Tab by mouth daily.  magnesium 250 mg tab Take  by mouth.  B.infantis-B.ani-B.long-B.bifi (PROBIOTIC 4X) 10-15 mg TbEC Take  by mouth.  hydrochlorothiazide (HYDRODIURIL) 25 mg tablet Take 1 Tab by mouth daily.  ASPIRIN/ACETAMINOPHEN/CAFFEINE (EXCEDRIN MIGRAINE PO) Take  by mouth as needed.  cephALEXin (KEFLEX) 500 mg capsule  cyclobenzaprine (FLEXERIL) 10 mg tablet  trimethoprim-sulfamethoxazole (BACTRIM DS, SEPTRA DS) 160-800 mg per tablet TAKE 1 TABLET BY MOUTH EVERY 12 HOURS  methocarbamol (ROBAXIN) 500 mg tablet Take 1 Tab by mouth four (4) times daily. No current facility-administered medications for this visit. Objective:  
 
Vitals:  
 09/28/18 1428 BP: 134/80 Pulse: 60 Resp: 18 Temp: 98 °F (36.7 °C) TempSrc: Oral  
SpO2: 98% Weight: 176 lb 9.6 oz (80.1 kg) Height: 5' 3\" (1.6 m) Physical Examination: 
General: Alert, cooperative, no distress, appears stated age. Eyes: Conjunctivae clear. PERRL, EOMs intact. Ears: Normal external ear canals both ears. Nose: Nares normal. Septum midline. Mucosa normal. No drainage or sinus tenderness. Mouth/Throat: Lips, mucosa, and tongue normal.  
Neck: Supple, symmetrical, trachea midline, no adenopathy. No thyroid enlargement/tenderness/nodules Back: Symmetric, no curvature. ROM normal. No CVA tenderness. Lungs: Clear to auscultation bilaterally. Normal inspiratory and expiratory ratio. Heart: Regular rate and rhythm, S1, S2 normal, no murmur, click, rub or gallop. Abdomen: Soft, non-tender. Extremities: Extremities normal, atraumatic, no cyanosis or edema. Pulses: 2+ and symmetric all extremities. Skin: Skin color, texture, turgor normal. No rashes or lesions on exposed skin. Lymph nodes: Cervical, supraclavicular nodes normal. 
Neurologic: CNII-XII intact. No visits with results within 3 Month(s) from this visit. Latest known visit with results is: 
 
Admission on 06/27/2015, Discharged on 06/27/2015 Component Date Value Ref Range Status  WBC 06/27/2015 6.3  3.6 - 11.0 K/uL Final  
 RBC 06/27/2015 4.93  3.80 - 5.20 M/uL Final  
  HGB 06/27/2015 14.1  11.5 - 16.0 g/dL Final  
 HCT 06/27/2015 42.2  35.0 - 47.0 % Final  
 MCV 06/27/2015 85.6  80.0 - 99.0 FL Final  
 MCH 06/27/2015 28.6  26.0 - 34.0 PG Final  
 MCHC 06/27/2015 33.4  30.0 - 36.5 g/dL Final  
 RDW 06/27/2015 14.0  11.5 - 14.5 % Final  
 PLATELET 07/12/8454 143  150 - 400 K/uL Final  
 NEUTROPHILS 06/27/2015 80* 32 - 75 % Final  
 BAND NEUTROPHILS 06/27/2015 8* 0 - 6 % Final  
 LYMPHOCYTES 06/27/2015 7* 12 - 49 % Final  
 MONOCYTES 06/27/2015 3* 5 - 13 % Final  
 EOSINOPHILS 06/27/2015 0  0 - 7 % Final  
 BASOPHILS 06/27/2015 2* 0 - 1 % Final  
 ABS. NEUTROPHILS 06/27/2015 5.6  1.8 - 8.0 K/UL Final  
 ABS. LYMPHOCYTES 06/27/2015 0.4* 0.8 - 3.5 K/UL Final  
 ABS. MONOCYTES 06/27/2015 0.2  0.0 - 1.0 K/UL Final  
 ABS. EOSINOPHILS 06/27/2015 0.0  0.0 - 0.4 K/UL Final  
 ABS. BASOPHILS 06/27/2015 0.1  0.0 - 0.1 K/UL Final  
 DF 06/27/2015 MANUAL    Final  
 RBC COMMENTS 06/27/2015 NORMOCYTIC, NORMOCHROMIC    Final  
 Sodium 06/27/2015 136  136 - 145 mmol/L Final  
 Potassium 06/27/2015 3.8  3.5 - 5.1 mmol/L Final  
 Chloride 06/27/2015 102  97 - 108 mmol/L Final  
 CO2 06/27/2015 25  21 - 32 mmol/L Final  
 Anion gap 06/27/2015 9  5 - 15 mmol/L Final  
 Glucose 06/27/2015 102* 65 - 100 mg/dL Final  
 BUN 06/27/2015 12  6 - 20 MG/DL Final  
 Creatinine 06/27/2015 0.74  0.45 - 1.15 MG/DL Final  
 BUN/Creatinine ratio 06/27/2015 16  12 - 20   Final  
 GFR est AA 06/27/2015 >60  >60 ml/min/1.73m2 Final  
 GFR est non-AA 06/27/2015 >60  >60 ml/min/1.73m2 Final  
 Comment: Estimated GFR is calculated using the IDMS-traceable Modification of Diet in Renal Disease (MDRD) Study equation, reported for both  Americans (GFRAA) and non- Americans (GFRNA), and normalized to 1.73m2 body surface area. The physician must decide which value applies to the patient.  
The MDRD study equation should only be used in individuals age 25 or older. It has not been validated for the following: pregnant women, patients with serious comorbid conditions, or on certain medications, or persons with extremes of body size, muscle mass, or nutritional status.  Calcium 06/27/2015 8.4* 8.5 - 10.1 MG/DL Final  
 Bilirubin, total 06/27/2015 0.3  0.2 - 1.0 MG/DL Final  
 ALT (SGPT) 06/27/2015 26  12 - 78 U/L Final  
 AST (SGOT) 06/27/2015 25  15 - 37 U/L Final  
 Alk. phosphatase 06/27/2015 89  45 - 117 U/L Final  
 Protein, total 06/27/2015 8.0  6.4 - 8.2 g/dL Final  
 Albumin 06/27/2015 3.5  3.5 - 5.0 g/dL Final  
 Globulin 06/27/2015 4.5* 2.0 - 4.0 g/dL Final  
 A-G Ratio 06/27/2015 0.8* 1.1 - 2.2   Final  
 Color 06/27/2015 YELLOW/STRAW    Final  
 Color Reference Range: Straw, Yellow or Dark Yellow  Appearance 06/27/2015 CLEAR  CLEAR   Final  
 Specific gravity 06/27/2015 1.014  1.003 - 1.030   Final  
 pH (UA) 06/27/2015 5.5  5.0 - 8.0   Final  
 Protein 06/27/2015 NEGATIVE   NEG mg/dL Final  
 Glucose 06/27/2015 NEGATIVE   NEG mg/dL Final  
 Ketone 06/27/2015 NEGATIVE   NEG mg/dL Final  
 Bilirubin 06/27/2015 NEGATIVE   NEG   Final  
 Blood 06/27/2015 MODERATE* NEG   Final  
 Urobilinogen 06/27/2015 0.2  0.2 - 1.0 EU/dL Final  
 Nitrites 06/27/2015 NEGATIVE   NEG   Final  
 Leukocyte Esterase 06/27/2015 NEGATIVE   NEG   Final  
 WBC 06/27/2015 0-4  0 - 4 /hpf Final  
 RBC 06/27/2015 5-10  0 - 5 /hpf Final  
 Epithelial cells 06/27/2015 MODERATE* FEW /lpf Final  
 Bacteria 06/27/2015 1+* NEG /hpf Final  
 Budding yeast 06/27/2015 PRESENT* NEG   Final  
 Lactic acid 06/27/2015 0.8  0.4 - 2.0 MMOL/L Final  
 Special Requests: 06/27/2015 NO SPECIAL REQUESTS    Final  
 Culture result: 06/27/2015 NO GROWTH 5 DAYS    Final  
 
 
 
 
Assessment/ Plan:  
Diagnoses and all orders for this visit: 1. Hematuria, unspecified type 2. Lung nodule 3.  Abnormal CT scan, chest 
 
 
 Persistent but improved hematuria 3 weeks after hospitalization for kidney stone. Advised patient this is likely part of healing. Less likely infection or recurrent stone given no supportive symptoms. Call to clinic if not resolved in 2 weeks, will need urology consult at that time. Discussed CT scan results and reason for referral to pulmonology including nodule + lymph enlargement  + suggestion of old granulomatous disease requiring close follow up CT in 6 month. Concerning for sarcoidosis and need pulm evaluation. Educated patient on red flag symptoms to warrant return to clinic or emergency room visit. I have discussed the diagnosis with the patient and the intended plan as seen in the above orders. The patient has been offered or received an after-visit summary and questions were answered concerning future plans. I have discussed medication side effects and warnings with the patient as well. Follow-up Disposition: 
Return if symptoms worsen or fail to improve, for Follow Up. Signed, Joseph Seo MD 
9/28/2018

## 2018-09-28 NOTE — MR AVS SNAPSHOT
[facility] 
 
 
 [address] 
[phone] Patient: Gurmeet Ramirez MRN: [id number] :1960 Visit Information Date & Time Provider Department Dept. Phone Encounter #  
 2018  2:30 PM Jsoeph Seo MD [address] [place], [place] [id number] [id number] Follow-up Instructions Return if symptoms worsen or fail to improve, for Follow Up. Upcoming Health Maintenance Date Due Hepatitis C Screening 1960 Shingrix Vaccine Age 50> (1 of 2) 2011 FOBT Q 1 YEAR AGE 50-75 2011 BREAST CANCER SCRN MAMMOGRAM 2014 Influenza Age 5 to Adult 3/31/2019* PAP AKA CERVICAL CYTOLOGY 2021 DTaP/Tdap/Td series (2 - Td) 2027 *Topic was postponed. The date shown is not the original due date. Allergies as of 2018  Review Complete On: 2018 By: Joseph Seo MD  
  
 Severity Noted Reaction Type Reactions Penicillins  2018    Unknown (comments) Current Immunizations  Never Reviewed Name Date PPD 2011 Tdap 2017 Not reviewed this visit You Were Diagnosed With   
  
 Codes Comments Hematuria, unspecified type    -  Primary ICD-10-CM: R31.9 ICD-9-CM: 599.70 Vitals BP Pulse Temp Resp Height(growth percentile) Weight(growth percentile) 134/80 (BP 1 Location: Right arm, BP Patient Position: Sitting) 60 98 °F (36.7 °C) (Oral) 18 5' 3\" (1.6 m) 176 lb 9.6 oz (80.1 kg) SpO2 BMI OB Status Smoking Status 98% 31.28 kg/m2 Hysterectomy Never Smoker Vitals History BMI and BSA Data Body Mass Index Body Surface Area  
 31.28 kg/m 2 1.89 m 2 Preferred Pharmacy Pharmacy Name Phone [facility] [address] [facility], [address] [facility] Floor [phone] Your Updated Medication List  
  
   
This list is accurate as of 18  3:01 PM.  Always use your most recent med list.  
  
  
  
  
 cephALEXin 500 mg capsule Commonly known as:  KEFLEX  
  
 cyclobenzaprine 10 mg tablet Commonly known as:  FLEXERIL  
  
 EXCEDRIN MIGRAINE PO Take  by mouth as needed. fluticasone 50 mcg/actuation nasal spray Commonly known as:  FLONASE  
  
 hydroCHLOROthiazide 25 mg tablet Commonly known as:  HYDRODIURIL Take 1 Tab by mouth daily. ibuprofen 600 mg tablet Commonly known as:  MOTRIN  
  
 magnesium 250 mg Tab Take  by mouth. methocarbamol 500 mg tablet Commonly known as:  ROBAXIN Take 1 Tab by mouth four (4) times daily. multivitamin tablet Commonly known as:  ONE A DAY Take 1 Tab by mouth daily. PROBIOTIC 4X 10-15 mg Tbec Generic drug:  B.infantis-B.ani-B.long-B.bifi Take  by mouth. trimethoprim-sulfamethoxazole 160-800 mg per tablet Commonly known as:  BACTRIM DS, SEPTRA DS  
TAKE 1 TABLET BY MOUTH EVERY 12 HOURS We Performed the Following AMB POC URINALYSIS DIP STICK AUTO W/O MICRO [15732 CPT(R)] Follow-up Instructions Return if symptoms worsen or fail to improve, for Follow Up. Patient Instructions Blood in the Urine: Care Instructions Your Care Instructions Blood in the urine, or hematuria, may make the urine look red, brown, or pink. There may be blood every time you urinate or just from time to time. You cannot always see blood in the urine, but it will show up in a urine test. 
Blood in the urine may be serious. It should always be checked by a doctor. Your doctor may recommend more tests, including an X-ray, a CT scan, or a cystoscopy (which lets a doctor look inside the urethra and bladder). Blood in the urine can be a sign of another problem. Common causes are bladder infections and kidney stones. An injury to your groin or your genital area can also cause bleeding in the urinary tract.  Very hard exercise-such as running a marathon-can cause blood in the urine. Blood in the urine can also be a sign of kidney disease or cancer in the bladder or kidney. Many cases of blood in the urine are caused by a harmless condition that runs in families. This is called benign familial hematuria. It does not need any treatment. Sometimes your urine may look red or brown even though it does not contain blood. For example, not getting enough fluids (dehydration), taking certain medicines, or having a liver problem can change the color of your urine. Eating foods such as beets, rhubarb, or blackberries or foods with red food coloring can make your urine look red or pink. Follow-up care is a key part of your treatment and safety. Be sure to make and go to all appointments, and call your doctor if you are having problems. It's also a good idea to know your test results and keep a list of the medicines you take. When should you call for help? Call your doctor now or seek immediate medical care if: 
  · You have symptoms of a urinary infection. For example: ¨ You have pus in your urine. ¨ You have pain in your back just below your rib cage. This is called flank pain. ¨ You have a fever, chills, or body aches. ¨ It hurts to urinate. ¨ You have groin or belly pain.  
  · You have more blood in your urine.  
 Watch closely for changes in your health, and be sure to contact your doctor if: 
  · You have new urination problems.  
  · You do not get better as expected. Where can you learn more? Go to http://eladio-esther.info/. Enter D049 in the search box to learn more about \"Blood in the Urine: Care Instructions. \" Current as of: May 12, 2017 Content Version: 11.7 © 8987-1818 Heavy. Care instructions adapted under license by [employer] (which disclaims liability or warranty for this information).  If you have questions about a medical condition or this instruction, always ask your healthcare professional. [address] any warranty or liability for your use of this information. Introducing \[facility]\"" & HEALTH SERVICES! Dear Patricia Brito: Thank you for requesting a apstrata account. Our records indicate that you already have an active apstrata account. You can access your account anytime at [email or web address] Did you know that you can access your hospital and ER discharge instructions at any time in apstrata? You can also review all of your test results from your hospital stay or ER visit. Additional Information If you have questions, please visit the Frequently Asked Questions section of the apstrata website at [email or web address] Remember, apstrata is NOT to be used for urgent needs. For medical emergencies, dial 911. Now available from your iPhone and Android! Please provide this summary of care documentation to your next provider. Your primary care clinician is listed as Miguel Latif. If you have any questions after today's visit, please call [phone].

## 2018-10-08 ENCOUNTER — TELEPHONE (OUTPATIENT)
Dept: FAMILY MEDICINE CLINIC | Age: 58
End: 2018-10-08

## 2018-10-08 NOTE — TELEPHONE ENCOUNTER
----- Message from Yuliana Boateng sent at 10/8/2018  3:55 PM EDT -----  Regarding: Dr .Wilburn Leyden: [phone]  Pt returned call from the nurse.  Best contact [phone]

## 2018-10-11 NOTE — TELEPHONE ENCOUNTER
Incoming call from patient. Informed her she has fasting labs that need to be done.  She states she will come in to have those done

## 2018-11-08 ENCOUNTER — OFFICE VISIT (OUTPATIENT)
Dept: FAMILY MEDICINE CLINIC | Age: 58
End: 2018-11-08

## 2018-11-08 VITALS
RESPIRATION RATE: 18 BRPM | SYSTOLIC BLOOD PRESSURE: 132 MMHG | WEIGHT: 179 LBS | DIASTOLIC BLOOD PRESSURE: 78 MMHG | HEART RATE: 60 BPM | TEMPERATURE: 98.5 F | HEIGHT: 63 IN | OXYGEN SATURATION: 96 % | BODY MASS INDEX: 31.71 KG/M2

## 2018-11-08 DIAGNOSIS — J06.9 VIRAL UPPER RESPIRATORY TRACT INFECTION: Primary | ICD-10-CM

## 2018-11-08 DIAGNOSIS — B09 VIRAL EXANTHEM: ICD-10-CM

## 2018-11-08 RX ORDER — TRIAMCINOLONE ACETONIDE 1 MG/G
CREAM TOPICAL 2 TIMES DAILY
Qty: 454 G | Refills: 0 | Status: SHIPPED | OUTPATIENT
Start: 2018-11-08

## 2018-11-08 RX ORDER — AZITHROMYCIN 250 MG/1
TABLET, FILM COATED ORAL
Qty: 6 TAB | Refills: 0 | Status: SHIPPED | OUTPATIENT
Start: 2018-11-08 | End: 2018-11-13

## 2018-11-08 NOTE — LETTER
NOTIFICATION RETURN TO WORK / SCHOOL 
 
11/8/2018 2:59 PM 
 
Ms. Sandy Raymundo Yosefda. [address] [id number] To Whom It May Concern: 
 
Sandy Raymundo is currently under the care of [facility]. She will return to work/school on: 11/12/18 If there are questions or concerns please have the patient contact our office. Sincerely, Cathi Roque NP

## 2018-11-08 NOTE — PROGRESS NOTES
[facility] Emergency Note    Dara Rivera is a 65 y.o. female who was seen in clinic today (11/8/2018). Subjective:  Upper Respiratory Infection  Patient complains of symptoms of a URI. Symptoms include congestion and cough. Onset of symptoms was 2 days ago, unchanged since that time. She also c/o congestion, non productive cough, post nasal drip, sinus pressure and sore throat for the past 2 days. She is drinking plenty of fluids. . Evaluation to date: none. Treatment to date: Cetrizine, Theraflu. Rash  Patient complains of rash involving the neck. Rash started 2 days ago. Appearance of rash at onset: Color of lesion(s): pink. Discomfort associated with rash: no discomfort associated with rash. Patient has not had previous treatment. Patient has had contacts with similar rash. Reports children have had pityriasis rosea is at her day care. Prior to Admission medications    Medication Sig Start Date End Date Taking? Authorizing Provider   azithromycin (ZITHROMAX) 250 mg tablet Take 2 tablets today, then take 1 tablet daily 11/8/18 11/13/18 Yes Heber Cook, HEIDI   triamcinolone acetonide (KENALOG) 0.1 % topical cream Apply  to affected area two (2) times a day. use thin layer 11/8/18  Yes Heber Cook NP   fluticasone (FLONASE) 50 mcg/actuation nasal spray  9/17/18  Yes Provider, Historical   ibuprofen (MOTRIN) 600 mg tablet  7/2/18  Yes Provider, Historical   multivitamin (ONE A DAY) tablet Take 1 Tab by mouth daily. Yes Provider, Historical   magnesium 250 mg tab Take  by mouth. Yes Provider, Historical   B.infantis-B.ani-B.long-B.bifi (PROBIOTIC 4X) 10-15 mg TbEC Take  by mouth. Yes Provider, Historical   hydrochlorothiazide (HYDRODIURIL) 25 mg tablet Take 1 Tab by mouth daily. 3/4/13  Yes Roslyn Nevarez NP   ASPIRIN/ACETAMINOPHEN/CAFFEINE (EXCEDRIN MIGRAINE PO) Take  by mouth as needed.    Yes Provider, Historical   cephALEXin (KEFLEX) 500 mg capsule  9/3/18   Provider, Historical   cyclobenzaprine (FLEXERIL) 10 mg tablet  7/2/18   Provider, Historical   trimethoprim-sulfamethoxazole (BACTRIM DS, SEPTRA DS) 160-800 mg per tablet TAKE 1 TABLET BY MOUTH EVERY 12 HOURS 9/2/18   Provider, Historical   methocarbamol (ROBAXIN) 500 mg tablet Take 1 Tab by mouth four (4) times daily. 7/16/18   Arminda Mckeon, HEIDI          Allergies   Allergen Reactions    Bactrim [Sulfamethoprim] Other (comments)    Penicillins Unknown (comments)         ROS  See HPI    Objective:   Physical Exam   Constitutional: She is oriented to person, place, and time. She appears well-developed and well-nourished. No distress. HENT:   Right Ear: Tympanic membrane and ear canal normal.   Left Ear: Tympanic membrane and ear canal normal.   Nose: Mucosal edema present. Right sinus exhibits no maxillary sinus tenderness and no frontal sinus tenderness. Left sinus exhibits no maxillary sinus tenderness and no frontal sinus tenderness. Mouth/Throat: Oropharynx is clear and moist.   Cardiovascular: Normal rate, regular rhythm and normal heart sounds. No murmur heard. Pulmonary/Chest: Effort normal and breath sounds normal. She has no decreased breath sounds. She has no wheezes. She has no rhonchi. Lymphadenopathy:     She has no cervical adenopathy. Neurological: She is alert and oriented to person, place, and time. Skin:        Psychiatric: She has a normal mood and affect. Her behavior is normal.   Nursing note and vitals reviewed. Visit Vitals  /78 (BP 1 Location: Left arm, BP Patient Position: Sitting)   Pulse 60   Temp 98.5 °F (36.9 °C) (Oral)   Resp 18   Ht 5' 3\" (1.6 m)   Wt 179 lb (81.2 kg)   SpO2 96%   BMI 31.71 kg/m²       Assessment & Plan:  Diagnoses and all orders for this visit:    1. Viral upper respiratory tract infection  Discussed that symptoms were viral and recommended treating symptoms.  Increase fluids and rest. Reviewed OTC products that patient could take. -     Delay fill azithromycin (ZITHROMAX) 250 mg tablet; Take 2 tablets today, then take 1 tablet daily    2. Viral exanthem  Consider early stages pityriasis rosea. Continue antihistamine daily. Steroid cream PRN. -     triamcinolone acetonide (KENALOG) 0.1 % topical cream; Apply  to affected area two (2) times a day. use thin layer      I have discussed the diagnosis with the patient and the intended plan as seen in the above orders. The patient has received an after-visit summary along with patient information handout. I have discussed medication side effects and warnings with the patient as well. Follow-up Disposition:  Return if symptoms worsen or fail to improve.         Cathi Roque NP

## 2018-11-08 NOTE — PROGRESS NOTES
Chief Complaint   Patient presents with    Nasal Congestion     x1 day     Cough     x1 day productive cough    Rash     x2 days raised red areas on neck with itching       1. Have you been to the ER, urgent care clinic since your last visit? Hospitalized since your last visit? No    2. Have you seen or consulted any other health care providers outside of the [address] [place] [address] since your last visit? Include any pap smears or colon screening. Yes.  Patient had allergy testing 11/06

## 2018-11-08 NOTE — PATIENT INSTRUCTIONS
Pityriasis Rosea: Care Instructions  Your Care Instructions    Pityriasis rosea (say \"pit-uh-RY-uh-dennise REBECCA-zee-uh\") is a common skin rash. It usually starts as one scaly, reddish-pink spot on your stomach or back. Days or weeks later, more spots appear. The rash may itch, but it will not spread to other people. No one knows what causes pityriasis rosea. Some doctors believe it is a reaction to a virus. Pityriasis rosea is most common in children and young adults. It lasts 1 to 3 months and then goes away on its own. Medicine can help relieve any itching. Follow-up care is a key part of your treatment and safety. Be sure to make and go to all appointments, and call your doctor if you are having problems. It's also a good idea to know your test results and keep a list of the medicines you take. How can you care for yourself at home? · Use your medicine exactly as prescribed. Call your doctor if you have any problems with your medicine. · Expose your skin to small amounts of sunlight, but avoid sunburn. Sunlight can lessen the rash. · Use a mild soap, such as Dove or Cetaphil, when you wash your skin. · Add a handful of oatmeal (ground to a powder) to your bath. Or you can try an oatmeal bath product, such as Aveeno. Keep the water warm or lukewarm. A hot bath or shower may make the rash more visible and itchy. · Use an over-the-counter 1% hydrocortisone cream for small itchy areas. When should you call for help? Call your doctor now or seek immediate medical care if:    · You have signs of infection such as:  ? Pain, warmth, or swelling near the rash. ? Red streaks near the rash. ? Pus coming from the rash. ? A fever.    Watch closely for changes in your health, and be sure to contact your doctor if:    · You see the rash on the palms of your hands or the soles of your feet.     · You do not get better as expected. Where can you learn more?   Go to http://eladio-esther.info/. Enter S327 in the search box to learn more about \"Pityriasis Rosea: Care Instructions. \"  Current as of: April 18, 2018  Content Version: 11.8  © 2823-8932 [employer]. Care instructions adapted under license by [employer] (which disclaims liability or warranty for this information). If you have questions about a medical condition or this instruction, always ask your healthcare professional. [address] any warranty or liability for your use of this information.

## 2019-01-07 RX ORDER — HYDROCHLOROTHIAZIDE 25 MG/1
25 TABLET ORAL DAILY
Qty: 90 TAB | Refills: 0 | Status: SHIPPED | OUTPATIENT
Start: 2019-01-07 | End: 2019-02-06 | Stop reason: SDUPTHER

## 2019-01-07 NOTE — TELEPHONE ENCOUNTER
----- Message from Que Toscano sent at 1/7/2019  2:43 PM EST -----  Regarding: Dr. Sona Schneider  Pt is requesting authorization for Hydrochlorothiazide 25 MG, due to no refills. Pt uses [facility] (on file). Pt is completely out of medication. Best contact number [phone]. Syed Reynolds Requested Prescriptions     Pending Prescriptions Disp Refills    hydroCHLOROthiazide (HYDRODIURIL) 25 mg tablet 90 Tab 1     Sig: Take 1 Tab by mouth daily.

## 2019-01-28 ENCOUNTER — OFFICE VISIT (OUTPATIENT)
Dept: FAMILY MEDICINE CLINIC | Age: 59
End: 2019-01-28

## 2019-01-28 ENCOUNTER — TELEPHONE (OUTPATIENT)
Dept: FAMILY MEDICINE CLINIC | Age: 59
End: 2019-01-28

## 2019-01-28 VITALS
SYSTOLIC BLOOD PRESSURE: 162 MMHG | TEMPERATURE: 96.8 F | HEART RATE: 57 BPM | RESPIRATION RATE: 17 BRPM | WEIGHT: 179.2 LBS | HEIGHT: 63 IN | BODY MASS INDEX: 31.75 KG/M2 | DIASTOLIC BLOOD PRESSURE: 80 MMHG | OXYGEN SATURATION: 97 %

## 2019-01-28 DIAGNOSIS — M54.2 NECK PAIN: Primary | ICD-10-CM

## 2019-01-28 RX ORDER — NAPROXEN 500 MG/1
500 TABLET ORAL 2 TIMES DAILY WITH MEALS
Qty: 30 TAB | Refills: 0 | Status: SHIPPED | OUTPATIENT
Start: 2019-01-28 | End: 2019-11-14 | Stop reason: ALTCHOICE

## 2019-01-28 RX ORDER — CYCLOBENZAPRINE HCL 5 MG
5-10 TABLET ORAL
Qty: 30 TAB | Refills: 0 | Status: SHIPPED | OUTPATIENT
Start: 2019-01-28 | End: 2019-11-14 | Stop reason: ALTCHOICE

## 2019-01-28 NOTE — PROGRESS NOTES
Health Maintenance Due Topic Date Due  
 Hepatitis C Screening  1960  Shingrix Vaccine Age 50> (1 of 2) 11/21/2011  
 FOBT Q 1 YEAR AGE 50-75  11/21/2011  BREAST CANCER SCRN MAMMOGRAM  06/01/2014 Chief Complaint Patient presents with  Neck Pain Right   
 Head Pain Back 1. Have you been to the ER, urgent care clinic since your last visit? Hospitalized since your last visit? No 
 
2. Have you seen or consulted any other health care providers outside of the [address] [place] [address] since your last visit? Include any pap smears or colon screening. No 
 
3) Do you have an Advance Directive on file? no 
 
Patient is accompanied by self I have received verbal consent from Shoaib Barros to discuss any/all medical information while they are present in the room.
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
## 2019-02-06 RX ORDER — HYDROCHLOROTHIAZIDE 25 MG/1
25 TABLET ORAL DAILY
Qty: 90 TAB | Refills: 0 | Status: SHIPPED | OUTPATIENT
Start: 2019-02-06 | End: 2019-05-06 | Stop reason: SDUPTHER

## 2019-02-06 NOTE — TELEPHONE ENCOUNTER
----- Message from Angelic Triplett sent at 2/5/2019  5:19 PM EST -----  Regarding: NP King/Refills  Pt is requesting refills on Hydrochlorothiazide 25 mg called in to [address] [facility]. Best contact # [phone]  . Requested Prescriptions     Pending Prescriptions Disp Refills    hydroCHLOROthiazide (HYDRODIURIL) 25 mg tablet 90 Tab 0     Sig: Take 1 Tab by mouth daily. Appointment due.

## 2019-05-06 RX ORDER — HYDROCHLOROTHIAZIDE 25 MG/1
25 TABLET ORAL DAILY
Qty: 90 TAB | Refills: 1 | Status: SHIPPED | OUTPATIENT
Start: 2019-05-06 | End: 2019-11-04 | Stop reason: SDUPTHER

## 2019-05-06 NOTE — TELEPHONE ENCOUNTER
----- Message from [facility] sent at 5/6/2019  2:37 PM EDT -----  Regarding: Dr Philip Rubio refill  The pt called to request a refill on her \"hydrochlorothiazide 25mg\" mediation, because there are no refills available. The Rx should go to the [facility] on file.       Best contact number is [phone]

## 2019-11-04 DIAGNOSIS — I10 ESSENTIAL HYPERTENSION: Primary | ICD-10-CM

## 2019-11-04 RX ORDER — HYDROCHLOROTHIAZIDE 25 MG/1
25 TABLET ORAL DAILY
Qty: 30 TAB | Refills: 0 | Status: SHIPPED | OUTPATIENT
Start: 2019-11-04 | End: 2019-12-01 | Stop reason: SDUPTHER

## 2019-11-14 ENCOUNTER — OFFICE VISIT (OUTPATIENT)
Dept: FAMILY MEDICINE CLINIC | Age: 59
End: 2019-11-14

## 2019-11-14 VITALS
HEIGHT: 63 IN | BODY MASS INDEX: 31.18 KG/M2 | RESPIRATION RATE: 16 BRPM | HEART RATE: 61 BPM | DIASTOLIC BLOOD PRESSURE: 80 MMHG | TEMPERATURE: 97.8 F | WEIGHT: 176 LBS | OXYGEN SATURATION: 98 % | SYSTOLIC BLOOD PRESSURE: 130 MMHG

## 2019-11-14 DIAGNOSIS — R42 EPISODIC LIGHTHEADEDNESS: ICD-10-CM

## 2019-11-14 DIAGNOSIS — E78.2 MIXED HYPERLIPIDEMIA: Primary | ICD-10-CM

## 2019-11-14 DIAGNOSIS — I10 ESSENTIAL HYPERTENSION: ICD-10-CM

## 2019-11-14 RX ORDER — EMOLLIENT COMBINATION NO.35
CREAM (GRAM) TOPICAL
COMMUNITY
Start: 2019-10-14 | End: 2021-08-09 | Stop reason: ALTCHOICE

## 2019-11-14 NOTE — PROGRESS NOTES
Chief Complaint   Patient presents with    Cholesterol Problem     follow up    Hypertension    Dry Eye     bilateral     Dizziness     symptoms have been occuring more often     1. Have you been to the ER, urgent care clinic since your last visit? Hospitalized since your last visit? No    2. Have you seen or consulted any other health care providers outside of the [address] [place] [address] since your last visit? Include any pap smears or colon screening.  No

## 2019-11-14 NOTE — PROGRESS NOTES
HISTORY OF PRESENT ILLNESS  Bipin Marshall is a 63 y.o. female. HPI  Overdue follow up chronic health problems. HTN. Taking prescribed HCTZ. Hyperlipidemia. Admits to not consistently following a healthy diet and does not exercise on a regular basis. Currently on no meds. C/o intermittent lightheadedness over past few weeks. Occurs randomly. Denies any change in coordination or falls. History of anemia with pregnancy years ago. Past Medical History:   Diagnosis Date    Other and unspecified hyperlipidemia 5/09    high CRP    Unspecified essential hypertension      Patient Active Problem List   Diagnosis Code    Migraine G43.909    Mixed hyperlipidemia E78.2    Essential hypertension I10    Obesity (BMI 30.0-34. 9) E66.9    Lung nodule R91.1     Current Outpatient Medications   Medication Sig    MIMYX crea     hydroCHLOROthiazide (HYDRODIURIL) 25 mg tablet Take 1 Tab by mouth daily.  triamcinolone acetonide (KENALOG) 0.1 % topical cream Apply  to affected area two (2) times a day. use thin layer    fluticasone (FLONASE) 50 mcg/actuation nasal spray     multivitamin (ONE A DAY) tablet Take 1 Tab by mouth daily.  B.infantis-B.ani-B.long-B.bifi (PROBIOTIC 4X) 10-15 mg TbEC Take  by mouth.  ASPIRIN/ACETAMINOPHEN/CAFFEINE (EXCEDRIN MIGRAINE PO) Take  by mouth as needed. No current facility-administered medications for this visit. Social History     Tobacco Use    Smoking status: Never Smoker    Smokeless tobacco: Never Used   Substance Use Topics    Alcohol use: No     Comment: rare    Drug use: No     Blood pressure 130/80, pulse 61, temperature 97.8 °F (36.6 °C), temperature source Oral, resp. rate 16, height 5' 3\" (1.6 m), weight 176 lb (79.8 kg), SpO2 98 %. Review of Systems   Constitutional: Negative. Respiratory: Negative for cough and shortness of breath. Cardiovascular: Negative for chest pain, palpitations and leg swelling. Gastrointestinal: Negative. Neurological: Positive for dizziness (intermittent). Negative for tingling, sensory change, focal weakness and headaches. Psychiatric/Behavioral: Negative. All other systems reviewed and are negative. Physical Exam   Constitutional: No distress. Overweight. Eyes: Pupils are equal, round, and reactive to light. EOM are normal.   Neck: Neck supple. No thyromegaly present. Cardiovascular: Normal rate, regular rhythm and normal heart sounds. Pulmonary/Chest: Effort normal and breath sounds normal.   Abdominal: Soft. She exhibits no distension. There is no tenderness. There is no guarding. Musculoskeletal: Normal range of motion. She exhibits no edema. Lymphadenopathy:     She has no cervical adenopathy. Neurological: She is alert. She has normal strength and normal reflexes. No cranial nerve deficit or sensory deficit. Coordination and gait normal.   Skin: Skin is warm and dry. Psychiatric: She has a normal mood and affect. ASSESSMENT and PLAN  Diagnoses and all orders for this visit:    1. Mixed hyperlipidemia  -     LIPID PANEL  -     METABOLIC PANEL, COMPREHENSIVE  -     [place] HANDLG&/OR CONVEY OF SPEC FOR TR OFFICE TO LAB  -     COLLECTION VENOUS BLOOD,VENIPUNCTURE  Reviewed healthy diet and exercise recommendations. Non fasting labs sent. 2. Essential hypertension  -     METABOLIC PANEL, COMPREHENSIVE  Controlled. Continue current treatment. 3. Episodic lightheadedness  -     CBC W/O DIFF  -     TSH 3RD GENERATION  Asymptomatic today in office. Will check above labs. 4. BMI 31.0-31.9,adult  Weight loss recommendations given. .    Follow up 6 months or sooner prn.

## 2019-11-14 NOTE — LETTER
NOTIFICATION RETURN TO WORK / SCHOOL 
 
11/14/2019 10:53 AM 
 
Ms. Lear Boxer Usama. [address] [address] To Whom It May Concern: 
 
Lear Boxer is currently under the care of [facility]. She was seen in the office today for a medical issue. It is recommended that she do not stay after work hours this evening. If there are questions or concerns please have the patient contact our office.  
 
 
 
Sincerely, 
 
 
Phil Beal NP

## 2019-11-15 LAB
ALBUMIN SERPL-MCNC: 4.2 G/DL (ref 3.5–5.5)
ALBUMIN/GLOB SERPL: 1.6 {RATIO} (ref 1.2–2.2)
ALP SERPL-CCNC: 113 IU/L (ref 39–117)
ALT SERPL-CCNC: 21 IU/L (ref 0–32)
AST SERPL-CCNC: 23 IU/L (ref 0–40)
BILIRUB SERPL-MCNC: 0.4 MG/DL (ref 0–1.2)
BUN SERPL-MCNC: 16 MG/DL (ref 6–24)
BUN/CREAT SERPL: 23 (ref 9–23)
CALCIUM SERPL-MCNC: 9.4 MG/DL (ref 8.7–10.2)
CHLORIDE SERPL-SCNC: 102 MMOL/L (ref 96–106)
CHOLEST SERPL-MCNC: 199 MG/DL (ref 100–199)
CO2 SERPL-SCNC: 27 MMOL/L (ref 20–29)
CREAT SERPL-MCNC: 0.7 MG/DL (ref 0.57–1)
ERYTHROCYTE [DISTWIDTH] IN BLOOD BY AUTOMATED COUNT: 13.4 % (ref 12.3–15.4)
GLOBULIN SER CALC-MCNC: 2.6 G/DL (ref 1.5–4.5)
GLUCOSE SERPL-MCNC: 85 MG/DL (ref 65–99)
HCT VFR BLD AUTO: 40.9 % (ref 34–46.6)
HDLC SERPL-MCNC: 43 MG/DL
HGB BLD-MCNC: 13.5 G/DL (ref 11.1–15.9)
INTERPRETATION, 910389: NORMAL
LDLC SERPL CALC-MCNC: 136 MG/DL (ref 0–99)
MCH RBC QN AUTO: 28.3 PG (ref 26.6–33)
MCHC RBC AUTO-ENTMCNC: 33 G/DL (ref 31.5–35.7)
MCV RBC AUTO: 86 FL (ref 79–97)
PLATELET # BLD AUTO: 298 X10E3/UL (ref 150–450)
POTASSIUM SERPL-SCNC: 3.9 MMOL/L (ref 3.5–5.2)
PROT SERPL-MCNC: 6.8 G/DL (ref 6–8.5)
RBC # BLD AUTO: 4.77 X10E6/UL (ref 3.77–5.28)
SODIUM SERPL-SCNC: 144 MMOL/L (ref 134–144)
TRIGL SERPL-MCNC: 100 MG/DL (ref 0–149)
TSH SERPL DL<=0.005 MIU/L-ACNC: 1.9 UIU/ML (ref 0.45–4.5)
VLDLC SERPL CALC-MCNC: 20 MG/DL (ref 5–40)
WBC # BLD AUTO: 7.1 X10E3/UL (ref 3.4–10.8)

## 2019-12-01 DIAGNOSIS — I10 ESSENTIAL HYPERTENSION: ICD-10-CM

## 2019-12-02 RX ORDER — HYDROCHLOROTHIAZIDE 25 MG/1
25 TABLET ORAL DAILY
Qty: 30 TAB | Refills: 5 | Status: SHIPPED | OUTPATIENT
Start: 2019-12-02 | End: 2020-08-27 | Stop reason: SDUPTHER

## 2019-12-02 NOTE — TELEPHONE ENCOUNTER
PCP: Robbi Thacker NP    Last appt: 11/14/2019  No future appointments. Requested Prescriptions     Pending Prescriptions Disp Refills    hydroCHLOROthiazide (HYDRODIURIL) 25 mg tablet 30 Tab 0     Sig: Take 1 Tab by mouth daily.

## 2020-04-24 ENCOUNTER — TELEPHONE (OUTPATIENT)
Dept: FAMILY MEDICINE CLINIC | Age: 60
End: 2020-04-24

## 2020-04-24 NOTE — TELEPHONE ENCOUNTER
----- Message from Alfred Reveles sent at 2020  3:02 PM EDT -----  Regarding: Dr. Tammy Fournier / [facility] Message/Vendor Calls      To: [facility]  Subject: Dr. Tammy Fournier / Telephone  Patient's first and last name: Bhavna Valentino  : 1960  ID numbers: #[id number]    Caller's first and last name:  [facility]  Reason for call: Rep says they have received medical records but still missing information for the pt. Please call back to provide missing details.   Callback required yes/no and why: Yes  Best contact number(s): [phone]  Details to clarify the request: N/A

## 2020-04-28 NOTE — TELEPHONE ENCOUNTER
Spoke with April from [employer] ( [employer] ). Information being requested is in reference to patient's life insurance , requesting information in reference to 2018 chest images. April requested information to be faxed to her directly at [phone]. Information obtained and faxed to April at [phone] with confirmation received.   Lee Azul LPN

## 2020-04-28 NOTE — TELEPHONE ENCOUNTER
Left voice message in reference to patient and would like clarification of what is needed.   Arlene Shepherd, RAINEN

## 2020-06-22 DIAGNOSIS — J45.20 MILD INTERMITTENT EXTRINSIC ASTHMA WITHOUT COMPLICATION: Primary | ICD-10-CM

## 2020-06-22 RX ORDER — ALBUTEROL SULFATE 90 UG/1
1-2 AEROSOL, METERED RESPIRATORY (INHALATION)
Qty: 1 INHALER | Refills: 0 | Status: SHIPPED | OUTPATIENT
Start: 2020-06-22 | End: 2020-08-27 | Stop reason: SDUPTHER

## 2020-08-03 ENCOUNTER — DOCUMENTATION ONLY (OUTPATIENT)
Dept: FAMILY MEDICINE CLINIC | Age: 60
End: 2020-08-03

## 2020-08-03 NOTE — PROGRESS NOTES
Received a medical record request from [address] [place] [address] requesting all medical records from the past five years. Information faxed to [employer].

## 2020-08-18 ENCOUNTER — TELEPHONE (OUTPATIENT)
Dept: FAMILY MEDICINE CLINIC | Age: 60
End: 2020-08-18

## 2020-08-18 NOTE — TELEPHONE ENCOUNTER
----- Message from Parvin Ying sent at 8/17/2020  1:09 PM EDT -----  Regarding: Mignon Christianson (NP) / telephone  Contact: [phone]  Caller's first and last name: n/a  Reason for call: Pt. needs to be scheduled for shingles vaccination.   Callback required yes/no and why: yes  Best contact number(s): [phone]  Details to clarify the request: n/a

## 2020-08-20 NOTE — TELEPHONE ENCOUNTER
Patient sent [employer] message informing that she is due for a follow up visit for chronic care. Informed that shingles vaccine can be given at time of follow up care appointment.   Katie Mckeon LPN

## 2020-08-27 ENCOUNTER — OFFICE VISIT (OUTPATIENT)
Dept: FAMILY MEDICINE CLINIC | Age: 60
End: 2020-08-27
Payer: COMMERCIAL

## 2020-08-27 VITALS
DIASTOLIC BLOOD PRESSURE: 70 MMHG | OXYGEN SATURATION: 97 % | RESPIRATION RATE: 16 BRPM | WEIGHT: 181 LBS | TEMPERATURE: 98 F | HEIGHT: 63 IN | SYSTOLIC BLOOD PRESSURE: 120 MMHG | HEART RATE: 60 BPM | BODY MASS INDEX: 32.07 KG/M2

## 2020-08-27 DIAGNOSIS — Z00.00 ROUTINE GENERAL MEDICAL EXAMINATION AT A HEALTH CARE FACILITY: Primary | ICD-10-CM

## 2020-08-27 DIAGNOSIS — Z23 ENCOUNTER FOR IMMUNIZATION: ICD-10-CM

## 2020-08-27 DIAGNOSIS — Z12.11 SCREENING FOR COLON CANCER: ICD-10-CM

## 2020-08-27 DIAGNOSIS — R91.8 MULTIPLE PULMONARY NODULES: ICD-10-CM

## 2020-08-27 DIAGNOSIS — J45.20 MILD INTERMITTENT ASTHMA WITHOUT COMPLICATION: ICD-10-CM

## 2020-08-27 DIAGNOSIS — I10 ESSENTIAL HYPERTENSION: ICD-10-CM

## 2020-08-27 PROCEDURE — 90750 HZV VACC RECOMBINANT IM: CPT

## 2020-08-27 PROCEDURE — 36415 COLL VENOUS BLD VENIPUNCTURE: CPT | Performed by: NURSE PRACTITIONER

## 2020-08-27 PROCEDURE — 90471 IMMUNIZATION ADMIN: CPT | Performed by: NURSE PRACTITIONER

## 2020-08-27 PROCEDURE — 99396 PREV VISIT EST AGE 40-64: CPT | Performed by: NURSE PRACTITIONER

## 2020-08-27 RX ORDER — ALBUTEROL SULFATE 90 UG/1
1-2 AEROSOL, METERED RESPIRATORY (INHALATION)
Qty: 1 INHALER | Refills: 2 | Status: SHIPPED | OUTPATIENT
Start: 2020-08-27 | End: 2021-08-09 | Stop reason: SDUPTHER

## 2020-08-27 RX ORDER — HYDROCHLOROTHIAZIDE 25 MG/1
25 TABLET ORAL DAILY
Qty: 30 TAB | Refills: 5 | Status: SHIPPED | OUTPATIENT
Start: 2020-08-27 | End: 2021-01-21 | Stop reason: SDUPTHER

## 2020-08-27 NOTE — PATIENT INSTRUCTIONS
Vaccine Information Statement    Recombinant Zoster (Shingles) Vaccine: What You Need to Know    Many Vaccine Information Statements are available in German and other languages. See www.immunize.org/vis  Hojas de información sobre vacunas están disponibles en español y en muchos otros idiomas. Visite www.immunize.org/vis    1. Why get vaccinated? Recombinant zoster (shingles) vaccine can prevent shingles. Shingles (also called herpes zoster, or just zoster) is a painful skin rash, usually with blisters. In addition to the rash, shingles can cause fever, headache, chills, or upset stomach. More rarely, shingles can lead to pneumonia, hearing problems, blindness, brain inflammation (encephalitis), or death. The most common complication of shingles is long-term nerve pain called postherpetic neuralgia (PHN). PHN occurs in the areas where the shingles rash was, even after the rash clears up. It can last for months or years after the rash goes away. The pain from PHN can be severe and debilitating. About 10 to 18% of people who get shingles will experience PHN. The risk of PHN increases with age. An older adult with shingles is more likely to develop PHN and have longer lasting and more severe pain than a younger person with shingles. Shingles is caused by the varicella zoster virus, the same virus that causes chickenpox. After you have chickenpox, the virus stays in your body and can cause shingles later in life. Shingles cannot be passed from one person to another, but the virus that causes shingles can spread and cause chickenpox in someone who had never had chickenpox or received chickenpox vaccine. 2. Recombinant shingles vaccine    Recombinant shingles vaccine provides strong protection against shingles. By preventing shingles, recombinant shingles vaccine also protects against PHN. Recombinant shingles vaccine is the preferred vaccine for the prevention of shingles.   However, a different vaccine, live shingles vaccine, may be used in some circumstances. The recombinant shingles vaccine is recommended for adults 50 years and older without serious immune problems. It is given as a two-dose series. This vaccine is also recommended for people who have already gotten another type of shingles vaccine, the live shingles vaccine. There is no live virus in this vaccine. Shingles vaccine may be given at the same time as other vaccines. 3. Talk with your health care provider    Tell your vaccine provider if the person getting the vaccine:   Has had an allergic reaction after a previous dose of recombinant shingles vaccine, or has any severe, life-threatening allergies.  Is pregnant or breastfeeding.  Is currently experiencing an episode of shingles. In some cases, your health care provider may decide to postpone shingles vaccination to a future visit. People with minor illnesses, such as a cold, may be vaccinated. People who are moderately or severely ill should usually wait until they recover before getting recombinant shingles vaccine. Your health care provider can give you more information. 4. Risks of a vaccine reaction     A sore arm with mild or moderate pain is very common after recombinant shingles vaccine, affecting about 80% of vaccinated people. Redness and swelling can also happen at the site of the injection.  Tiredness, muscle pain, headache, shivering, fever, stomach pain, and nausea happen after vaccination in more than half of people who receive recombinant shingles vaccine. In clinical trials, about 1 out of 6 people who got recombinant zoster vaccine experienced side effects that prevented them from doing regular activities. Symptoms usually went away on their own in 2 to 3 days. You should still get the second dose of recombinant zoster vaccine even if you had one of these reactions after the first dose.       People sometimes faint after medical procedures, including vaccination. Tell your provider if you feel dizzy or have vision changes or ringing in the ears. As with any medicine, there is a very remote chance of a vaccine causing a severe allergic reaction, other serious injury, or death. 5. What if there is a serious problem? An allergic reaction could occur after the vaccinated person leaves the clinic. If you see signs of a severe allergic reaction (hives, swelling of the face and throat, difficulty breathing, a fast heartbeat, dizziness, or weakness), call 9-1-1 and get the person to the nearest hospital.    For other signs that concern you, call your health care provider. Adverse reactions should be reported to the Vaccine Adverse Event Reporting System (VAERS). Your health care provider will usually file this report, or you can do it yourself. Visit the VAERS website at www.vaers. [facility].gov or call 6-284.990.8780. VAERS is only for reporting reactions, and VAERS staff do not give medical advice. 6. How can I learn more?  Ask your health care provider.  Call your local or state health department.    Contact the Centers for Disease Control and Prevention (CDC):  - Call 6-919.741.5701 (1-800-CDC-INFO) or  - Visit CDCs website at www.cdc.gov/vaccines    Vaccine Information Statement   Recombinant Zoster Vaccine   10/30/2019    [facility] Disease Control and Prevention    Office Use Only

## 2020-08-27 NOTE — PROGRESS NOTES
Chief Complaint   Patient presents with    Cholesterol Problem     fasting     Hypertension    Medication Refill     hctz    Immunization/Injection     SHINGRIX :  FIRST DOSE      1. Have you been to the ER, urgent care clinic since your last visit? Hospitalized since your last visit? No    2. Have you seen or consulted any other health care providers outside of the [address] [place] [address] since your last visit? Include any pap smears or colon screening. Cynthia Villatoro  is a 63 y.o.  female  who present for [facility]  immunizations/injections. He/she denies any symptoms , reactions or allergies that would exclude them from being immunized today. Risks and adverse reactions were discussed and the VIS was given if applicable to them. All questions were addressed. He/She was observed for 10 min post injection. There were no reactions observed.     Sulaiman Schmitt LPN

## 2020-08-27 NOTE — PROGRESS NOTES
HISTORY OF PRESENT ILLNESS  Josse Jeter is a 63 y.o. female. HPI  Presents for CPE and follow up health problems. Admits to recently not adhering to healthy diet and exercise. Weight is up about 5 lbs over past 6 months. UTD with colonoscopy, Pap and screening mammogram. Followed by gyn. HTN. Taking prescribed HCTZ. Hyperlipidemia. Diet controlled. Asthma. Using albuterol prn. Average use 2-3x week, usually associated with activity. History of pulmonary nodules. Diagnosed 2018, was seen by pulmonologist Dr. Krysten Khan. Patient unsure if advised of any follow up. Past Medical History:   Diagnosis Date    Multiple pulmonary nodules 2018    Dr. Krysten Khan pulmonologist    Other and unspecified hyperlipidemia 5/09    high CRP    Unspecified essential hypertension      Patient Active Problem List   Diagnosis Code    Migraine G43.909    Mixed hyperlipidemia E78.2    Essential hypertension I10    Obesity (BMI 30.0-34. 9) E66.9    Multiple pulmonary nodules R91.8    Mild intermittent asthma without complication L50.63     Current Outpatient Medications   Medication Sig    hydroCHLOROthiazide (HYDRODIURIL) 25 mg tablet Take 1 Tab by mouth daily.  albuterol (PROVENTIL HFA, VENTOLIN HFA, PROAIR HFA) 90 mcg/actuation inhaler Take 1-2 Puffs by inhalation every four (4) hours as needed for Wheezing.  MIMYX crea     fluticasone (FLONASE) 50 mcg/actuation nasal spray     multivitamin (ONE A DAY) tablet Take 1 Tab by mouth daily.  B.infantis-B.ani-B.long-B.bifi (PROBIOTIC 4X) 10-15 mg TbEC Take  by mouth.  ASPIRIN/ACETAMINOPHEN/CAFFEINE (EXCEDRIN MIGRAINE PO) Take  by mouth as needed.  triamcinolone acetonide (KENALOG) 0.1 % topical cream Apply  to affected area two (2) times a day. use thin layer     No current facility-administered medications for this visit.       Social History     Tobacco Use    Smoking status: Never Smoker    Smokeless tobacco: Never Used   Substance Use Topics    Alcohol use: No     Comment: rare    Drug use: No     Blood pressure 120/70, pulse 60, temperature 98 °F (36.7 °C), temperature source Oral, resp. rate 16, height 5' 3\" (1.6 m), weight 181 lb (82.1 kg), SpO2 97 %. Review of Systems   Constitutional: Negative for chills, fever and malaise/fatigue. Respiratory: Negative for cough, hemoptysis, sputum production, shortness of breath and wheezing. Cardiovascular: Negative for chest pain, palpitations and leg swelling. Neurological: Negative for dizziness and headaches. All other systems reviewed and are negative. Physical Exam  Constitutional:       General: She is not in acute distress. HENT:      Right Ear: Tympanic membrane and ear canal normal.      Left Ear: Tympanic membrane and ear canal normal.   Eyes:      Extraocular Movements: Extraocular movements intact. Pupils: Pupils are equal, round, and reactive to light. Neck:      Musculoskeletal: Neck supple. Cardiovascular:      Rate and Rhythm: Normal rate and regular rhythm. Heart sounds: Normal heart sounds. Pulmonary:      Effort: Pulmonary effort is normal.      Breath sounds: Normal breath sounds. No wheezing. Abdominal:      General: There is no distension. Palpations: Abdomen is soft. Tenderness: There is no abdominal tenderness. Musculoskeletal: Normal range of motion. Right lower leg: No edema. Left lower leg: No edema. Lymphadenopathy:      Cervical: No cervical adenopathy. Skin:     General: Skin is warm and dry. Neurological:      General: No focal deficit present. Mental Status: She is alert. Coordination: Coordination normal.   Psychiatric:         Mood and Affect: Mood normal.         Behavior: Behavior normal.         ASSESSMENT and PLAN  Diagnoses and all orders for this visit:    1.  Routine general medical examination at a health care facility  -     CBC W/O DIFF  -     HEMOGLOBIN A1C WITH EAG  -     LIPID PANEL  - METABOLIC PANEL, COMPREHENSIVE  -     MS HANDLG&/OR CONVEY OF SPEC FOR TR OFFICE TO LAB  -     COLLECTION VENOUS BLOOD,VENIPUNCTURE  Age appropriate health maintenance and prevention reviewed. Follow healthy diet and exercise regularly at least 4-5x weekly. Fasting labs sent. 2. Essential hypertension  -     hydroCHLOROthiazide (HYDRODIURIL) 25 mg tablet; Take 1 Tab by mouth daily. Well controlled. Continue current treatment. 3. Screening for colon cancer  -     OCCULT BLOOD IMMUNOASSAY,DIAGNOSTIC    4. Encounter for immunization  -     ZOSTER VACC RECOMBINANT ADJUVANTED  -     MS IMMUNIZ ADMIN,1 SINGLE/COMB VAC/TOXOID    5. Multiple pulmonary nodules  Advised patient to contact Dr. Holly Johnson to inquire about recommended surveillance. 6. Mild intermittent asthma without complication  -     albuterol (PROVENTIL HFA, VENTOLIN HFA, PROAIR HFA) 90 mcg/actuation inhaler; Take 1-2 Puffs by inhalation every four (4) hours as needed for Wheezing. Stable on prn albuterol. Follow up 6 months or sooner prn. We discussed the expected course, resolution and complications of the diagnosis in detail. Medication risks, benefits, costs, interactions and side effects were discussed. The patient was advised to contact the office for worsening of condition or FTI as anticipated. The patient expressed understanding of the diagnosis and plan.

## 2020-08-28 LAB
ALBUMIN SERPL-MCNC: 4.2 G/DL (ref 3.8–4.9)
ALBUMIN/GLOB SERPL: 1.6 {RATIO} (ref 1.2–2.2)
ALP SERPL-CCNC: 119 IU/L (ref 39–117)
ALT SERPL-CCNC: 19 IU/L (ref 0–32)
AST SERPL-CCNC: 21 IU/L (ref 0–40)
BILIRUB SERPL-MCNC: 0.4 MG/DL (ref 0–1.2)
BUN SERPL-MCNC: 13 MG/DL (ref 6–24)
BUN/CREAT SERPL: 20 (ref 9–23)
CALCIUM SERPL-MCNC: 9.7 MG/DL (ref 8.7–10.2)
CHLORIDE SERPL-SCNC: 100 MMOL/L (ref 96–106)
CHOLEST SERPL-MCNC: 216 MG/DL (ref 100–199)
CO2 SERPL-SCNC: 25 MMOL/L (ref 20–29)
CREAT SERPL-MCNC: 0.65 MG/DL (ref 0.57–1)
ERYTHROCYTE [DISTWIDTH] IN BLOOD BY AUTOMATED COUNT: 13 % (ref 11.7–15.4)
EST. AVERAGE GLUCOSE BLD GHB EST-MCNC: 120 MG/DL
GLOBULIN SER CALC-MCNC: 2.7 G/DL (ref 1.5–4.5)
GLUCOSE SERPL-MCNC: 91 MG/DL (ref 65–99)
HBA1C MFR BLD: 5.8 % (ref 4.8–5.6)
HCT VFR BLD AUTO: 43 % (ref 34–46.6)
HDLC SERPL-MCNC: 45 MG/DL
HGB BLD-MCNC: 14 G/DL (ref 11.1–15.9)
INTERPRETATION, 910389: NORMAL
LDLC SERPL CALC-MCNC: 144 MG/DL (ref 0–99)
MCH RBC QN AUTO: 28.1 PG (ref 26.6–33)
MCHC RBC AUTO-ENTMCNC: 32.6 G/DL (ref 31.5–35.7)
MCV RBC AUTO: 86 FL (ref 79–97)
PLATELET # BLD AUTO: 314 X10E3/UL (ref 150–450)
POTASSIUM SERPL-SCNC: 3.7 MMOL/L (ref 3.5–5.2)
PROT SERPL-MCNC: 6.9 G/DL (ref 6–8.5)
RBC # BLD AUTO: 4.98 X10E6/UL (ref 3.77–5.28)
SODIUM SERPL-SCNC: 142 MMOL/L (ref 134–144)
TRIGL SERPL-MCNC: 136 MG/DL (ref 0–149)
VLDLC SERPL CALC-MCNC: 27 MG/DL (ref 5–40)
WBC # BLD AUTO: 7 X10E3/UL (ref 3.4–10.8)

## 2020-09-04 LAB — HEMOCCULT STL QL IA: NEGATIVE

## 2020-09-16 ENCOUNTER — PATIENT MESSAGE (OUTPATIENT)
Dept: FAMILY MEDICINE CLINIC | Age: 60
End: 2020-09-16

## 2020-09-19 NOTE — TELEPHONE ENCOUNTER
----- Message from Yevgeniy Doherty sent at 9/16/2020  3:26 PM EDT -----  Regarding: NP [place]/Telephone  Caller's first and last name and relationship (if not the patient): n/a  Best contact number(s): [phone] if pt doesn't answer, please communicate through [employer] Portal with messages. Whose call is being returned: unknown  Details to clarify the request: Pt missed incoming call from providers office regarding scheduling her pneumonia shot. Requesting a call back.

## 2020-09-29 NOTE — TELEPHONE ENCOUNTER
Chief Complaint   Patient presents with    Immunization/Injection     pnuemonia 23 vaccine, patient informed that she can obtain her pneumonia 23 vaccine at a local pharmacy via [employer] patient responded to [place].

## 2021-01-21 DIAGNOSIS — I10 ESSENTIAL HYPERTENSION: ICD-10-CM

## 2021-01-21 RX ORDER — HYDROCHLOROTHIAZIDE 25 MG/1
25 TABLET ORAL DAILY
Qty: 90 TAB | Refills: 0 | Status: SHIPPED | OUTPATIENT
Start: 2021-01-21 | End: 2021-04-20

## 2021-01-21 NOTE — TELEPHONE ENCOUNTER
HEIDI Espana,    Patient call requesting new rx for hctz. I contacted the pharmacy as she should have 1 refill left and she does have 30 tabs left. They are filling however, patient prefers 90 d/s. ThanksRahat    Last Visit: 8/27/20 HEIDI Espana  Next Appointment: Not scheduled- due 2/2021  Previous Refill Encounter(s): 8/27/20 30 + 5    Requested Prescriptions     Pending Prescriptions Disp Refills    hydroCHLOROthiazide (HYDRODIURIL) 25 mg tablet 90 Tab 0     Sig: Take 1 Tab by mouth daily.

## 2021-04-20 DIAGNOSIS — I10 ESSENTIAL HYPERTENSION: ICD-10-CM

## 2021-04-20 RX ORDER — HYDROCHLOROTHIAZIDE 25 MG/1
TABLET ORAL
Qty: 90 TAB | Refills: 0 | Status: SHIPPED | OUTPATIENT
Start: 2021-04-20 | End: 2021-07-21 | Stop reason: SDUPTHER

## 2021-07-21 DIAGNOSIS — I10 ESSENTIAL HYPERTENSION: ICD-10-CM

## 2021-07-21 RX ORDER — HYDROCHLOROTHIAZIDE 25 MG/1
TABLET ORAL
Qty: 30 TABLET | Refills: 0 | Status: SHIPPED | OUTPATIENT
Start: 2021-07-21 | End: 2021-08-09

## 2021-07-21 NOTE — TELEPHONE ENCOUNTER
NP Malorie,    Patient call requesting refill of hctz. Also routing to front office to contact patient.   Thanks, Reina Valdovinos    Last Visit: 8/27/20 HEIDI Espana  Next Appointment: Not scheduled- Due appt  Previous Refill Encounter(s): 4/20/21 90 (with request for appt)    Requested Prescriptions     Pending Prescriptions Disp Refills    hydroCHLOROthiazide (HYDRODIURIL) 25 mg tablet 90 Tablet 0     Sig: TAKE 1 TABLET BY MOUTH ONCE DAILY **DUE  FOR  APPOINTMENT**

## 2021-07-22 PROBLEM — R91.8 ABNORMAL CT LUNG SCREENING: Status: ACTIVE | Noted: 2021-07-22

## 2021-07-22 PROBLEM — R31.9 HEMATURIA: Status: ACTIVE | Noted: 2021-07-22

## 2021-08-09 ENCOUNTER — OFFICE VISIT (OUTPATIENT)
Dept: FAMILY MEDICINE CLINIC | Age: 61
End: 2021-08-09
Payer: COMMERCIAL

## 2021-08-09 VITALS
WEIGHT: 177.8 LBS | HEIGHT: 63 IN | OXYGEN SATURATION: 98 % | TEMPERATURE: 97 F | HEART RATE: 52 BPM | RESPIRATION RATE: 16 BRPM | SYSTOLIC BLOOD PRESSURE: 135 MMHG | BODY MASS INDEX: 31.5 KG/M2 | DIASTOLIC BLOOD PRESSURE: 74 MMHG

## 2021-08-09 DIAGNOSIS — Z12.31 ENCOUNTER FOR SCREENING MAMMOGRAM FOR MALIGNANT NEOPLASM OF BREAST: ICD-10-CM

## 2021-08-09 DIAGNOSIS — R73.03 PRE-DIABETES: ICD-10-CM

## 2021-08-09 DIAGNOSIS — Z00.00 ROUTINE GENERAL MEDICAL EXAMINATION AT A HEALTH CARE FACILITY: Primary | ICD-10-CM

## 2021-08-09 DIAGNOSIS — I10 ESSENTIAL HYPERTENSION: ICD-10-CM

## 2021-08-09 DIAGNOSIS — J30.89 PERENNIAL ALLERGIC RHINITIS: ICD-10-CM

## 2021-08-09 DIAGNOSIS — J45.20 MILD INTERMITTENT ASTHMA WITHOUT COMPLICATION: ICD-10-CM

## 2021-08-09 DIAGNOSIS — R91.8 PULMONARY NODULES: ICD-10-CM

## 2021-08-09 DIAGNOSIS — R00.1 BRADYCARDIA: ICD-10-CM

## 2021-08-09 PROBLEM — R31.9 HEMATURIA: Status: RESOLVED | Noted: 2021-07-22 | Resolved: 2021-08-09

## 2021-08-09 LAB
ALBUMIN SERPL-MCNC: 3.6 G/DL (ref 3.5–5)
ALBUMIN/GLOB SERPL: 1 {RATIO} (ref 1.1–2.2)
ALP SERPL-CCNC: 136 U/L (ref 45–117)
ALT SERPL-CCNC: 24 U/L (ref 12–78)
ANION GAP SERPL CALC-SCNC: 4 MMOL/L (ref 5–15)
AST SERPL-CCNC: 18 U/L (ref 15–37)
BILIRUB SERPL-MCNC: 0.4 MG/DL (ref 0.2–1)
BUN SERPL-MCNC: 13 MG/DL (ref 6–20)
BUN/CREAT SERPL: 19 (ref 12–20)
CALCIUM SERPL-MCNC: 9.2 MG/DL (ref 8.5–10.1)
CHLORIDE SERPL-SCNC: 104 MMOL/L (ref 97–108)
CHOLEST SERPL-MCNC: 210 MG/DL
CO2 SERPL-SCNC: 32 MMOL/L (ref 21–32)
CREAT SERPL-MCNC: 0.67 MG/DL (ref 0.55–1.02)
ERYTHROCYTE [DISTWIDTH] IN BLOOD BY AUTOMATED COUNT: 13.7 % (ref 11.5–14.5)
EST. AVERAGE GLUCOSE BLD GHB EST-MCNC: 126 MG/DL
GLOBULIN SER CALC-MCNC: 3.6 G/DL (ref 2–4)
GLUCOSE SERPL-MCNC: 92 MG/DL (ref 65–100)
HBA1C MFR BLD: 6 % (ref 4–5.6)
HCT VFR BLD AUTO: 45.2 % (ref 35–47)
HDLC SERPL-MCNC: 48 MG/DL
HDLC SERPL: 4.4 {RATIO} (ref 0–5)
HGB BLD-MCNC: 13.9 G/DL (ref 11.5–16)
LDLC SERPL CALC-MCNC: 142.2 MG/DL (ref 0–100)
MCH RBC QN AUTO: 28.4 PG (ref 26–34)
MCHC RBC AUTO-ENTMCNC: 30.8 G/DL (ref 30–36.5)
MCV RBC AUTO: 92.2 FL (ref 80–99)
NRBC # BLD: 0 K/UL (ref 0–0.01)
NRBC BLD-RTO: 0 PER 100 WBC
PLATELET # BLD AUTO: 320 K/UL (ref 150–400)
PMV BLD AUTO: 10.9 FL (ref 8.9–12.9)
POTASSIUM SERPL-SCNC: 3.7 MMOL/L (ref 3.5–5.1)
PROT SERPL-MCNC: 7.2 G/DL (ref 6.4–8.2)
RBC # BLD AUTO: 4.9 M/UL (ref 3.8–5.2)
SODIUM SERPL-SCNC: 140 MMOL/L (ref 136–145)
TRIGL SERPL-MCNC: 99 MG/DL (ref ?–150)
TSH SERPL DL<=0.05 MIU/L-ACNC: 1.87 UIU/ML (ref 0.36–3.74)
VLDLC SERPL CALC-MCNC: 19.8 MG/DL
WBC # BLD AUTO: 7.1 K/UL (ref 3.6–11)

## 2021-08-09 PROCEDURE — 99396 PREV VISIT EST AGE 40-64: CPT | Performed by: NURSE PRACTITIONER

## 2021-08-09 RX ORDER — HYDROCHLOROTHIAZIDE 25 MG/1
TABLET ORAL
Qty: 30 TABLET | Refills: 0 | Status: CANCELLED | OUTPATIENT
Start: 2021-08-09

## 2021-08-09 RX ORDER — FLUTICASONE PROPIONATE 50 MCG
SPRAY, SUSPENSION (ML) NASAL
Qty: 1 BOTTLE | Status: CANCELLED | OUTPATIENT
Start: 2021-08-09

## 2021-08-09 RX ORDER — HYDROCHLOROTHIAZIDE 25 MG/1
TABLET ORAL
Qty: 90 TABLET | Refills: 1 | Status: SHIPPED | OUTPATIENT
Start: 2021-08-09 | End: 2022-02-18 | Stop reason: SDUPTHER

## 2021-08-09 RX ORDER — FLUTICASONE PROPIONATE 50 MCG
SPRAY, SUSPENSION (ML) NASAL
Qty: 1 BOTTLE | Refills: 5 | Status: SHIPPED | OUTPATIENT
Start: 2021-08-09 | End: 2022-06-09 | Stop reason: SDUPTHER

## 2021-08-09 RX ORDER — ALBUTEROL SULFATE 90 UG/1
1-2 AEROSOL, METERED RESPIRATORY (INHALATION)
Qty: 1 INHALER | Refills: 2 | Status: SHIPPED | OUTPATIENT
Start: 2021-08-09

## 2021-08-09 NOTE — PROGRESS NOTES
HISTORY OF PRESENT ILLNESS  Colon Severe is a 62 y.o. female. HPI  Presents for CPE. Following healthy diet, exercising fairly regularly. PMH ANTONIO, no longer candidate for paps. Due for screening mammogram.    UTD with colonoscopy. Does not recall who did last procedure. HTN. Taking HCTZ. Hypercholesterolemia. Diet controlled. Pre diabetes. Has reduced sugar and processed carbs in diet. AR. Reports allergies have been acting up. Using flonase. History of intermittent asthma, usually allergy triggered. Using albuterol prn as needed. Heart rate noted to be low today. Asymptomatic. History of pulmonary nodules. Was followed by pulmonary Dr. Crystal Diehl. Advised at last OV to follow up with him for surveillance recommendations. She has not done this. Past Medical History:   Diagnosis Date    Hematuria 7/22/2021    Multiple pulmonary nodules 2018    Dr. Cm Ruiz pulmonologist    Other and unspecified hyperlipidemia 5/09    high CRP    Unspecified essential hypertension      Patient Active Problem List   Diagnosis Code    Migraine G43.909    Essential hypertension I10    Obesity (BMI 30.0-34. 9) E66.9    Multiple pulmonary nodules R91.8    Mild intermittent asthma without complication X03.35    Abnormal CT lung screening R91.8    Perennial allergic rhinitis J30.89     Current Outpatient Medications   Medication Sig    hydroCHLOROthiazide (HYDRODIURIL) 25 mg tablet TAKE 1 TABLET BY MOUTH ONCE DAILY    fluticasone propionate (FLONASE) 50 mcg/actuation nasal spray 2 sprays to each nostril daily.  albuterol (PROVENTIL HFA, VENTOLIN HFA, PROAIR HFA) 90 mcg/actuation inhaler Take 1-2 Puffs by inhalation every four (4) hours as needed for Wheezing.  triamcinolone acetonide (KENALOG) 0.1 % topical cream Apply  to affected area two (2) times a day. use thin layer    multivitamin (ONE A DAY) tablet Take 1 Tab by mouth daily.     B.infantis-B.ani-B.long-B.bifi (PROBIOTIC 4X) 10-15 mg TbEC Take  by mouth.  ASPIRIN/ACETAMINOPHEN/CAFFEINE (EXCEDRIN MIGRAINE PO) Take  by mouth as needed. No current facility-administered medications for this visit. Social History     Tobacco Use    Smoking status: Never Smoker    Smokeless tobacco: Never Used   Substance Use Topics    Alcohol use: No     Comment: rare    Drug use: No     Blood pressure 135/74, pulse (!) 52, temperature 97 °F (36.1 °C), temperature source Temporal, resp. rate 16, height 5' 3\" (1.6 m), weight 177 lb 12.8 oz (80.6 kg), SpO2 98 %. Review of Systems   Constitutional: Negative for weight loss. Respiratory: Positive for shortness of breath (mild, exertional). Negative for cough, hemoptysis, sputum production and wheezing. Cardiovascular: Negative for chest pain, palpitations and leg swelling. Neurological: Negative for dizziness and headaches. All other systems reviewed and are negative. Physical Exam  Constitutional:       General: She is not in acute distress. HENT:      Right Ear: Tympanic membrane and ear canal normal.      Left Ear: Tympanic membrane and ear canal normal.   Eyes:      Extraocular Movements: Extraocular movements intact. Pupils: Pupils are equal, round, and reactive to light. Cardiovascular:      Rate and Rhythm: Regular rhythm. Bradycardia present. Heart sounds: Normal heart sounds. Pulmonary:      Effort: Pulmonary effort is normal.      Breath sounds: Normal breath sounds. No wheezing. Abdominal:      General: There is no distension. Palpations: Abdomen is soft. There is no mass. Tenderness: There is no abdominal tenderness. Musculoskeletal:         General: Normal range of motion. Cervical back: Neck supple. Right lower leg: No edema. Left lower leg: No edema. Lymphadenopathy:      Cervical: No cervical adenopathy. Skin:     General: Skin is warm and dry. Neurological:      General: No focal deficit present.       Mental Status: She is alert.   Psychiatric:         Mood and Affect: Mood normal.         Behavior: Behavior normal.         ASSESSMENT and PLAN  Diagnoses and all orders for this visit:    1. Routine general medical examination at a health care facility  -     HEMOGLOBIN A1C WITH EAG; Future  -     LIPID PANEL; Future  -     METABOLIC PANEL, COMPREHENSIVE; Future  -     TSH 3RD GENERATION; Future  -     CBC W/O DIFF; Future  Age appropriate health maintenance and prevention reviewed. Follow healthy diet and exercise regularly at least 4-5x weekly. Fasting labs sent. 2. Essential hypertension  -     hydroCHLOROthiazide (HYDRODIURIL) 25 mg tablet; TAKE 1 TABLET BY MOUTH ONCE DAILY  Controlled. 3. Bradycardia  Attempted to do EKG, machine not functioning. Asymptomatic, will monitor. 4. Pre-diabetes  -     HEMOGLOBIN A1C WITH EAG; Future  Reviewed diabetic diet and carbohydrate restriction. 5. Mild intermittent asthma without complication  -     fluticasone propionate (FLONASE) 50 mcg/actuation nasal spray; 2 sprays to each nostril daily. -     albuterol (PROVENTIL HFA, VENTOLIN HFA, PROAIR HFA) 90 mcg/actuation inhaler; Take 1-2 Puffs by inhalation every four (4) hours as needed for Wheezing. 6. Encounter for screening mammogram for malignant neoplasm of breast  -     [place]AM[place] SCREENING INCL CAD; Future    7. Perennial allergic rhinitis    8. Pulmonary nodules  -     CT CHEST WO CONT; Future  Will schedule CT chest.  Will need pulmonary consult for further recommendation. Follow up with Dr. Miky Mendoza. Follow up 6 months. We discussed the expected course, resolution and complications of the diagnosis in detail. Medication risks, benefits, costs, interactions and side effects were discussed. The patient was advised to contact the office for worsening of condition or FTI as anticipated. The patient expressed understanding of the diagnosis and plan.

## 2021-08-09 NOTE — PROGRESS NOTES
Chief Complaint   Patient presents with    Hypertension       1. Have you been to the ER, urgent care clinic since your last visit? Hospitalized since your last visit? No    2. Have you seen or consulted any other health care providers outside of the [address] [place] [address] since your last visit? Include any pap smears or colon screening.  No    3 most recent PHQ Screens 8/9/2021   Little interest or pleasure in doing things Not at all   Feeling down, depressed, irritable, or hopeless Not at all   Total Score PHQ 2 0

## 2021-09-13 ENCOUNTER — HOSPITAL ENCOUNTER (OUTPATIENT)
Dept: MAMMOGRAPHY | Age: 61
Discharge: HOME | End: 2021-09-13
Payer: COMMERCIAL

## 2021-09-13 ENCOUNTER — HOSPITAL ENCOUNTER (OUTPATIENT)
Dept: CT IMAGING | Age: 61
Discharge: HOME | End: 2021-09-13
Payer: COMMERCIAL

## 2021-09-13 DIAGNOSIS — R91.8 PULMONARY NODULES: ICD-10-CM

## 2021-09-13 DIAGNOSIS — Z12.31 ENCOUNTER FOR SCREENING MAMMOGRAM FOR MALIGNANT NEOPLASM OF BREAST: ICD-10-CM

## 2021-09-13 PROCEDURE — 77063 BREAST TOMOSYNTHESIS BI: CPT

## 2021-09-13 PROCEDURE — 71250 CT THORAX DX C-: CPT

## 2022-01-19 ENCOUNTER — TELEPHONE (OUTPATIENT)
Dept: FAMILY MEDICINE CLINIC | Age: 62
End: 2022-01-19

## 2022-01-19 NOTE — TELEPHONE ENCOUNTER
----- Message from Brigette Cervantes sent at 1/19/2022 10:44 AM EST -----  Subject: Appointment Request    Reason for Call: Semi-Routine Cough, Cold Symptoms    QUESTIONS  Type of Appointment? Established Patient  Reason for appointment request? No appointments available during search  Additional Information for Provider? Patient has congestion headache,   pressure around eye, and a runny nose. Please call patient back if a   appointment becomes available.  ---------------------------------------------------------------------------  --------------  CALL BACK INFO  What is the best way for the office to contact you? OK to leave message on   voicemail  Preferred Call Back Phone Number? [phone]  ---------------------------------------------------------------------------  --------------  SCRIPT ANSWERS  Relationship to Patient? Self  Are you currently unable to finish sentences due to any difficulty   breathing? No  Are you unable to swallow liquids? No  Are you having fevers (100.4 or greater), chills, or sweats? No  Do you have COPD, asthma or a chronic lung condition? No  Have your symptoms been present for more than 5 days? No  Have you recently (14 days) been seen by a provider for this issue? No  Have you been diagnosed with, awaiting test results for, or told that you   are suspected of having COVID-19 (Coronavirus)? (If patient has tested   negative or was tested as a requirement for work, school, or travel and   not based on symptoms, answer no)? No  Within the past two weeks have you developed any of the following symptoms   (answer no if symptoms have been present longer than 2 weeks or began   more than 2 weeks ago)? Fever or Chills, Cough, Shortness of breath or   difficulty breathing, Loss of taste or smell, Sore throat, Nasal   congestion, Sneezing or runny nose, Fatigue or generalized body aches   (answer no if pain is specific to a body part e.g. back pain), Diarrhea,   Headache?  Yes

## 2022-01-19 NOTE — TELEPHONE ENCOUNTER
Called Pt offered next available VV 1/28/22. Pt refused and said she believes its a sinus infection. She requested antibiotic to treat instead.

## 2022-01-20 NOTE — TELEPHONE ENCOUNTER
Attempted to call patient. Left secure voicemail informing patient that provider advised cold and flu medication. Stated that if patient would like medication prescribed, she would have to be seen virtually by our office or an available provider by using [facility]. Asked patient to call back our office at [phone] if there are any questions.

## 2022-02-18 DIAGNOSIS — I10 ESSENTIAL HYPERTENSION: ICD-10-CM

## 2022-02-18 RX ORDER — HYDROCHLOROTHIAZIDE 25 MG/1
TABLET ORAL
Qty: 90 TABLET | Refills: 0 | Status: SHIPPED | OUTPATIENT
Start: 2022-02-18 | End: 2022-05-22 | Stop reason: SDUPTHER

## 2022-03-18 PROBLEM — E66.9 OBESITY (BMI 30.0-34.9): Status: ACTIVE | Noted: 2018-07-16

## 2022-03-18 PROBLEM — E66.811 OBESITY (BMI 30.0-34.9): Status: ACTIVE | Noted: 2018-07-16

## 2022-03-19 PROBLEM — R91.8 ABNORMAL CT LUNG SCREENING: Status: ACTIVE | Noted: 2021-07-22

## 2022-03-19 PROBLEM — J45.20 MILD INTERMITTENT ASTHMA WITHOUT COMPLICATION: Status: ACTIVE | Noted: 2020-08-27

## 2022-03-19 PROBLEM — I10 ESSENTIAL HYPERTENSION: Status: ACTIVE | Noted: 2018-06-18

## 2022-03-19 PROBLEM — J30.89 PERENNIAL ALLERGIC RHINITIS: Status: ACTIVE | Noted: 2021-08-09

## 2022-03-19 PROBLEM — R91.8 MULTIPLE PULMONARY NODULES: Status: ACTIVE | Noted: 2018-09-10

## 2022-05-16 ENCOUNTER — VIRTUAL VISIT (OUTPATIENT)
Dept: FAMILY MEDICINE CLINIC | Age: 62
End: 2022-05-16

## 2022-05-16 ENCOUNTER — TELEPHONE (OUTPATIENT)
Dept: FAMILY MEDICINE CLINIC | Age: 62
End: 2022-05-16

## 2022-05-16 DIAGNOSIS — J06.9 VIRAL URI WITH COUGH: Primary | ICD-10-CM

## 2022-05-16 PROCEDURE — 99213 OFFICE O/P EST LOW 20 MIN: CPT | Performed by: NURSE PRACTITIONER

## 2022-05-16 NOTE — PROGRESS NOTES
Cheyenne Acosta is a 62 y.o. female who was seen by synchronous (real-time) audio-video technology on 5/16/2022 for Cough        Assessment & Plan:   Diagnoses and all orders for this visit:    1. Viral URI with cough    Rest and fluids. Recommend COVID test.  Mucinex DM bid as directed. Add nyquil at bedtime if cough persists. Continue prn albuterol. Follow up prn if sx worsen or FTI. 712  Subjective:   C/p cough and chest congestion x 4 days. No fever. No known sick exposures. Has not done COVID test.   Taking liquid mucinex with some sx improvement. Cough is keeping her up at night. Using albuterol prn for chest tightness. Prior to Admission medications    Medication Sig Start Date End Date Taking? Authorizing Provider   hydroCHLOROthiazide (HYDRODIURIL) 25 mg tablet TAKE 1 TABLET BY MOUTH ONCE DAILY. Appointment due. 2/18/22  Yes Donis Espana, HEIDI   fluticasone propionate (FLONASE) 50 mcg/actuation nasal spray 2 sprays to each nostril daily. 8/9/21  Yes Donis Espana NP   albuterol (PROVENTIL HFA, VENTOLIN HFA, PROAIR HFA) 90 mcg/actuation inhaler Take 1-2 Puffs by inhalation every four (4) hours as needed for Wheezing. 8/9/21  Yes Donis Espana NP   triamcinolone acetonide (KENALOG) 0.1 % topical cream Apply  to affected area two (2) times a day. use thin layer 11/8/18  Yes Jose Angel Cook, NP   multivitamin (ONE A DAY) tablet Take 1 Tab by mouth daily. Yes Provider, Historical   B.infantis-B.ani-B.long-B.bifi (PROBIOTIC 4X) 10-15 mg TbEC Take  by mouth. Yes Provider, Historical   ASPIRIN/ACETAMINOPHEN/CAFFEINE (EXCEDRIN MIGRAINE PO) Take  by mouth as needed. Yes Provider, Historical     Patient Active Problem List   Diagnosis Code    Migraine G43.909    Essential hypertension I10    Obesity (BMI 30.0-34. 9) E66.9    Multiple pulmonary nodules R91.8    Mild intermittent asthma without complication B94.24    Abnormal CT lung screening R91.8    Perennial allergic rhinitis J30.89       Review of Systems   Constitutional: Negative for chills, fever and malaise/fatigue. HENT: Negative for congestion, sinus pain and sore throat. Respiratory: Positive for cough and wheezing (mild, intermittent). Negative for sputum production and shortness of breath. Cardiovascular: Negative for chest pain. Musculoskeletal: Negative for myalgias. Neurological: Negative for headaches. All other systems reviewed and are negative. Objective:   No flowsheet data found. General: alert, cooperative, no distress   Mental  status: normal mood, behavior, speech, dress, motor activity, and thought processes, able to follow commands   HENT: NCAT   Neck: no visualized mass   Resp: no respiratory distress   Neuro: no gross deficits   Skin: no discoloration or lesions of concern on visible areas   Psychiatric: normal affect, consistent with stated mood, no evidence of hallucinations     Additional exam findings: We discussed the expected course, resolution and complications of the diagnosis(es) in detail. Medication risks, benefits, costs, interactions, and alternatives were discussed as indicated. I advised her to contact the office if her condition worsens, changes or fails to improve as anticipated. She expressed understanding with the diagnosis(es) and plan. Bc Cueva, was evaluated through a synchronous (real-time) audio-video encounter. The patient (or guardian if applicable) is aware that this is a billable service, which includes applicable co-pays. Verbal consent to proceed has been obtained. The visit was conducted pursuant to the emergency declaration under the [facility], [address] [place] [address] authority and the [employer] and [employer] General Act. Patient identification was verified, and a caregiver was present when appropriate.  The patient was located at home in a state where the provider was licensed to provide care.     Candice Smith NP

## 2022-05-16 NOTE — TELEPHONE ENCOUNTER
Called patient to adv that due to her symptoms and start date of symptoms her appt will be switched to virtual. DENNISE

## 2022-05-17 DIAGNOSIS — J06.9 UPPER RESPIRATORY TRACT INFECTION, UNSPECIFIED TYPE: Primary | ICD-10-CM

## 2022-05-17 RX ORDER — AZITHROMYCIN 250 MG/1
TABLET, FILM COATED ORAL
Qty: 6 TABLET | Refills: 0 | Status: SHIPPED | OUTPATIENT
Start: 2022-05-17 | End: 2022-05-22

## 2022-05-19 ENCOUNTER — TELEPHONE (OUTPATIENT)
Dept: FAMILY MEDICINE CLINIC | Age: 62
End: 2022-05-19

## 2022-05-19 NOTE — TELEPHONE ENCOUNTER
Chief Complaint   Patient presents with    Letter for School/Work     sinus infection. Patient was seen on 05/16/22 for cough virtual visit. Patient need letter for returned to work on 05/20/22.

## 2022-05-19 NOTE — TELEPHONE ENCOUNTER
----- Message from Ariadna Xiao sent at 5/19/2022  3:41 PM EDT -----  Subject: Message to Provider    QUESTIONS  Information for Provider? Patient would like to request Dr. alex from   work 5/17 thru 5/19 as she is experiencing sinus infection. Please contact   patient to assist and if further information is needed. ---------------------------------------------------------------------------  --------------  Deborah BRASHER  What is the best way for the office to contact you? OK to leave message on   voicemail  Preferred Call Back Phone Number? [phone]  ---------------------------------------------------------------------------  --------------  SCRIPT ANSWERS  Relationship to Patient?  Self

## 2022-06-09 ENCOUNTER — OFFICE VISIT (OUTPATIENT)
Dept: FAMILY MEDICINE CLINIC | Age: 62
End: 2022-06-09
Payer: COMMERCIAL

## 2022-06-09 VITALS
HEIGHT: 63 IN | HEART RATE: 62 BPM | DIASTOLIC BLOOD PRESSURE: 77 MMHG | SYSTOLIC BLOOD PRESSURE: 134 MMHG | BODY MASS INDEX: 31.54 KG/M2 | TEMPERATURE: 98 F | OXYGEN SATURATION: 96 % | RESPIRATION RATE: 16 BRPM | WEIGHT: 178 LBS

## 2022-06-09 DIAGNOSIS — I10 ESSENTIAL HYPERTENSION: Primary | ICD-10-CM

## 2022-06-09 DIAGNOSIS — J30.89 PERENNIAL ALLERGIC RHINITIS: ICD-10-CM

## 2022-06-09 DIAGNOSIS — J45.20 MILD INTERMITTENT ASTHMA WITHOUT COMPLICATION: ICD-10-CM

## 2022-06-09 DIAGNOSIS — R91.8 MULTIPLE PULMONARY NODULES: ICD-10-CM

## 2022-06-09 DIAGNOSIS — E78.00 HYPERCHOLESTEROLEMIA: ICD-10-CM

## 2022-06-09 LAB
ALBUMIN SERPL-MCNC: 3.7 G/DL (ref 3.5–5)
ALBUMIN/GLOB SERPL: 1.1 {RATIO} (ref 1.1–2.2)
ALP SERPL-CCNC: 126 U/L (ref 45–117)
ALT SERPL-CCNC: 28 U/L (ref 12–78)
ANION GAP SERPL CALC-SCNC: 5 MMOL/L (ref 5–15)
AST SERPL-CCNC: 23 U/L (ref 15–37)
BILIRUB SERPL-MCNC: 0.5 MG/DL (ref 0.2–1)
BUN SERPL-MCNC: 10 MG/DL (ref 6–20)
BUN/CREAT SERPL: 15 (ref 12–20)
CALCIUM SERPL-MCNC: 9.4 MG/DL (ref 8.5–10.1)
CHLORIDE SERPL-SCNC: 105 MMOL/L (ref 97–108)
CHOLEST SERPL-MCNC: 206 MG/DL
CO2 SERPL-SCNC: 31 MMOL/L (ref 21–32)
CREAT SERPL-MCNC: 0.66 MG/DL (ref 0.55–1.02)
GLOBULIN SER CALC-MCNC: 3.5 G/DL (ref 2–4)
GLUCOSE SERPL-MCNC: 81 MG/DL (ref 65–100)
HDLC SERPL-MCNC: 46 MG/DL
HDLC SERPL: 4.5 {RATIO} (ref 0–5)
LDLC SERPL CALC-MCNC: 133.6 MG/DL (ref 0–100)
POTASSIUM SERPL-SCNC: 3.9 MMOL/L (ref 3.5–5.1)
PROT SERPL-MCNC: 7.2 G/DL (ref 6.4–8.2)
SODIUM SERPL-SCNC: 141 MMOL/L (ref 136–145)
TRIGL SERPL-MCNC: 132 MG/DL (ref ?–150)
VLDLC SERPL CALC-MCNC: 26.4 MG/DL

## 2022-06-09 PROCEDURE — 99214 OFFICE O/P EST MOD 30 MIN: CPT | Performed by: NURSE PRACTITIONER

## 2022-06-09 RX ORDER — MONTELUKAST SODIUM 10 MG/1
10 TABLET ORAL DAILY
Qty: 30 TABLET | Refills: 5 | Status: SHIPPED | OUTPATIENT
Start: 2022-06-09

## 2022-06-09 RX ORDER — FLUTICASONE PROPIONATE 50 MCG
SPRAY, SUSPENSION (ML) NASAL
Qty: 1 EACH | Refills: 5 | Status: SHIPPED | OUTPATIENT
Start: 2022-06-09

## 2022-06-09 NOTE — PROGRESS NOTES
Chief Complaint   Patient presents with    Hypertension    Labs     1. Have you been to the ER, urgent care clinic since your last visit? Hospitalized since your last visit? No    2. Have you seen or consulted any other health care providers outside of the [address] [place] [address] since your last visit? Include any pap smears or colon screening.  No

## 2022-06-09 NOTE — PROGRESS NOTES
HISTORY OF PRESENT ILLNESS  Buelah Landau is a 62 y.o. female. HPI  Follow up health problems. HTN. Taking HCTZ. Hypercholesterolemia. Diet controlled. Pre diabetes. Has reduced sugar and processed carbs in diet. AR. Using flonase until rx ran out. Requests refill. Asthma. Using albuterol prn with occasional flares. Recently had URI, had used albuterol more frequently. Requesting rx for singulair.  is on med. History of pulmonary nodules. Was followed by pulmonary Dr. Prince Alvarez for periodic surveillance. Patient Active Problem List   Diagnosis Code    Migraine G43.909    Essential hypertension I10    Obesity (BMI 30.0-34. 9) E66.9    Multiple pulmonary nodules R91.8    Mild intermittent asthma without complication E12.64    Abnormal CT lung screening R91.8    Perennial allergic rhinitis J30.89     Current Outpatient Medications   Medication Sig    montelukast (SINGULAIR) 10 mg tablet Take 1 Tablet by mouth daily.  fluticasone propionate (FLONASE) 50 mcg/actuation nasal spray 2 sprays to each nostril daily.  hydroCHLOROthiazide (HYDRODIURIL) 25 mg tablet TAKE 1 TABLET BY MOUTH ONCE DAILY. Appointment due.  albuterol (PROVENTIL HFA, VENTOLIN HFA, PROAIR HFA) 90 mcg/actuation inhaler Take 1-2 Puffs by inhalation every four (4) hours as needed for Wheezing.  triamcinolone acetonide (KENALOG) 0.1 % topical cream Apply  to affected area two (2) times a day. use thin layer    multivitamin (ONE A DAY) tablet Take 1 Tab by mouth daily.  B.infantis-B.ani-B.long-B.bifi (PROBIOTIC 4X) 10-15 mg TbEC Take  by mouth.  ASPIRIN/ACETAMINOPHEN/CAFFEINE (EXCEDRIN MIGRAINE PO) Take  by mouth as needed. No current facility-administered medications for this visit.      Social History     Tobacco Use    Smoking status: Never Smoker    Smokeless tobacco: Never Used   Substance Use Topics    Alcohol use: No     Comment: rare    Drug use: No     Blood pressure 134/77, pulse 62, temperature 98 °F (36.7 °C), temperature source Temporal, resp. rate 16, height 5' 3\" (1.6 m), weight 178 lb (80.7 kg), SpO2 96 %. Review of Systems   Constitutional: Negative for chills, fever and malaise/fatigue. Respiratory: Positive for cough (lingering after URI). Negative for sputum production, shortness of breath and wheezing. Cardiovascular: Negative for chest pain, palpitations and leg swelling. Neurological: Negative for dizziness and headaches. All other systems reviewed and are negative. Physical Exam  Constitutional:       General: She is not in acute distress. Cardiovascular:      Rate and Rhythm: Normal rate and regular rhythm. Heart sounds: Normal heart sounds. Pulmonary:      Effort: Pulmonary effort is normal.      Breath sounds: Normal breath sounds. No wheezing. Musculoskeletal:      Cervical back: Neck supple. Right lower leg: No edema. Left lower leg: No edema. Lymphadenopathy:      Cervical: No cervical adenopathy. Skin:     General: Skin is warm and dry. Neurological:      Mental Status: She is alert. Coordination: Coordination normal.   Psychiatric:         Mood and Affect: Mood normal.         ASSESSMENT and PLAN  Diagnoses and all orders for this visit:    1. Essential hypertension  -     METABOLIC PANEL, COMPREHENSIVE; Future  Controlled. Continue current treatment. 2. Mild intermittent asthma without complication  -     montelukast (SINGULAIR) 10 mg tablet; Take 1 Tablet by mouth daily. Stable. Will add singulair. Continue prn albuterol. 3. Hypercholesterolemia  -     LIPID PANEL; Future  Reviewed healthy diet and exercise recommendations. Non fasting labs sent. 4. Perennial allergic rhinitis  -     fluticasone propionate (FLONASE) 50 mcg/actuation nasal spray; 2 sprays to each nostril daily. 5. Multiple pulmonary nodules  Follow up as scheduled with pulmonologist.  Continue yearly surveillance.     Follow up 6 months. We discussed the expected course, resolution and complications of the diagnosis in detail. Medication risks, benefits, costs, interactions and side effects were discussed. The patient was advised to contact the office for worsening of condition or FTI as anticipated. The patient expressed understanding of the diagnosis and plan.

## 2022-06-21 DIAGNOSIS — I10 ESSENTIAL HYPERTENSION: ICD-10-CM

## 2022-06-21 RX ORDER — HYDROCHLOROTHIAZIDE 25 MG/1
25 TABLET ORAL DAILY
Qty: 90 TABLET | Refills: 1 | Status: SHIPPED | OUTPATIENT
Start: 2022-06-21

## 2022-06-21 NOTE — TELEPHONE ENCOUNTER
Last Visit: 6/9/22 with NP Malorie  Next Appointment: Advised to follow-up in 6 months  Previous Refill Encounter(s): 5/23/22 #30    Requested Prescriptions     Pending Prescriptions Disp Refills    hydroCHLOROthiazide (HYDRODIURIL) 25 mg tablet 90 Tablet 1     Sig: Take 1 Tablet by mouth daily.          For Gregg Sauceda in place:    Recommendation Provided To:    Intervention Detail: New Rx: 1, reason: Patient Preference   Gap Closed?:    Intervention Accepted By:   Tejas Benson Time Spent (min): 5

## 2022-10-11 ENCOUNTER — TRANSCRIBE ORDER (OUTPATIENT)
Dept: SCHEDULING | Age: 62
End: 2022-10-11

## 2022-10-11 DIAGNOSIS — Z12.31 SCREENING MAMMOGRAM FOR HIGH-RISK PATIENT: Primary | ICD-10-CM

## 2022-10-31 ENCOUNTER — OFFICE VISIT (OUTPATIENT)
Dept: FAMILY MEDICINE CLINIC | Age: 62
End: 2022-10-31
Payer: COMMERCIAL

## 2022-10-31 VITALS
DIASTOLIC BLOOD PRESSURE: 80 MMHG | BODY MASS INDEX: 31.4 KG/M2 | WEIGHT: 177.2 LBS | TEMPERATURE: 97.4 F | HEART RATE: 56 BPM | OXYGEN SATURATION: 97 % | SYSTOLIC BLOOD PRESSURE: 138 MMHG | RESPIRATION RATE: 16 BRPM | HEIGHT: 63 IN

## 2022-10-31 DIAGNOSIS — M25.511 ACUTE PAIN OF RIGHT SHOULDER: Primary | ICD-10-CM

## 2022-10-31 PROCEDURE — 3078F DIAST BP <80 MM HG: CPT | Performed by: NURSE PRACTITIONER

## 2022-10-31 PROCEDURE — 3074F SYST BP LT 130 MM HG: CPT | Performed by: NURSE PRACTITIONER

## 2022-10-31 PROCEDURE — 99213 OFFICE O/P EST LOW 20 MIN: CPT | Performed by: NURSE PRACTITIONER

## 2022-10-31 RX ORDER — DICLOFENAC SODIUM 75 MG/1
75 TABLET, DELAYED RELEASE ORAL 2 TIMES DAILY
Qty: 30 TABLET | Refills: 0 | Status: SHIPPED | OUTPATIENT
Start: 2022-10-31

## 2022-10-31 RX ORDER — CRISABOROLE 20 MG/G
OINTMENT TOPICAL
COMMUNITY
Start: 2022-08-16

## 2022-10-31 NOTE — PROGRESS NOTES
HISTORY OF PRESENT ILLNESS  Mono Pettit is a 62 y.o. female. HPI  C/o pain right shoulder. Denies any injury. 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 Has tried topical analgesia with minor symptom relief. Past medical history, social history, family history and medications were reviewed and updated. Blood pressure 138/80, pulse (!) 56, temperature 97.4 °F (36.3 °C), temperature source Temporal, resp. rate 16, height 5' 3\" (1.6 m), weight 177 lb 3.2 oz (80.4 kg), SpO2 97 %. Review of Systems   Musculoskeletal:  Positive for joint pain. Neurological:  Negative for tingling, sensory change and weakness. All other systems reviewed and are negative. Physical Exam  Constitutional:       General: She is not in acute distress. Cardiovascular:      Rate and Rhythm: Normal rate and regular rhythm. Heart sounds: Normal heart sounds. Pulmonary:      Effort: Pulmonary effort is normal.      Breath sounds: Normal breath sounds. Musculoskeletal:      Right shoulder: Tenderness (anterolateral) present. No swelling or bony tenderness. Normal range of motion. Left shoulder: Normal.      Cervical back: Normal.   Skin:     General: Skin is warm and dry. Neurological:      Mental Status: She is alert. ASSESSMENT and PLAN  Diagnoses and all orders for this visit:    1. Acute pain of right shoulder  -     diclofenac EC (VOLTAREN) 75 mg EC tablet; Take 1 Tablet by mouth two (2) times a day. Possible tendinitis. Avoid heavy overhead lifting. Stretches as demonstrated. Trial voltaren as directed. Follow up prn if sx worsen or FTI. We discussed the expected course, resolution and complications of the diagnosis in detail. Medication risks, benefits, costs, interactions and side effects were discussed. The patient was advised to contact the office for worsening of condition or FTI as anticipated. The patient expressed understanding of the diagnosis and plan.

## 2022-11-07 ENCOUNTER — TELEPHONE (OUTPATIENT)
Dept: FAMILY MEDICINE CLINIC | Age: 62
End: 2022-11-07

## 2022-11-07 NOTE — TELEPHONE ENCOUNTER
Patient states that she was diagnose with tendonitis in her shoulder with imflamtion. She states that she does lifting at her job, and would need some kind of doctors to note to excuse her from lifting at work. She would as like to know what she can, and cant do at work and home. Patient would like a call back so soon as possible, and was hoping to get this not soon. 5/1 during the day 6/10 night.     Best call back number  [phone]

## 2022-11-25 ENCOUNTER — HOSPITAL ENCOUNTER (OUTPATIENT)
Dept: MAMMOGRAPHY | Age: 62
Discharge: HOME | End: 2022-11-25
Payer: COMMERCIAL

## 2022-11-25 DIAGNOSIS — Z12.31 SCREENING MAMMOGRAM FOR HIGH-RISK PATIENT: ICD-10-CM

## 2022-11-25 PROCEDURE — 77063 BREAST TOMOSYNTHESIS BI: CPT

## 2022-11-27 DIAGNOSIS — J30.89 PERENNIAL ALLERGIC RHINITIS: ICD-10-CM

## 2022-11-28 RX ORDER — FLUTICASONE PROPIONATE 50 MCG
SPRAY, SUSPENSION (ML) NASAL
Qty: 1 EACH | Refills: 5 | Status: SHIPPED | OUTPATIENT
Start: 2022-11-28

## 2022-11-28 NOTE — TELEPHONE ENCOUNTER
Chief Complaint   Patient presents with    Medication Refill     fluticasone propionate (FLONASE) 50 mcg/actuation nasal spray     Patient last visit was 10/31/22

## 2022-12-14 ENCOUNTER — OFFICE VISIT (OUTPATIENT)
Dept: FAMILY MEDICINE CLINIC | Age: 62
End: 2022-12-14
Payer: COMMERCIAL

## 2022-12-14 ENCOUNTER — TELEPHONE (OUTPATIENT)
Dept: FAMILY MEDICINE CLINIC | Age: 62
End: 2022-12-14

## 2022-12-14 VITALS
OXYGEN SATURATION: 98 % | BODY MASS INDEX: 31.18 KG/M2 | HEART RATE: 81 BPM | RESPIRATION RATE: 16 BRPM | HEIGHT: 63 IN | SYSTOLIC BLOOD PRESSURE: 139 MMHG | WEIGHT: 176 LBS | DIASTOLIC BLOOD PRESSURE: 75 MMHG | TEMPERATURE: 98.2 F

## 2022-12-14 DIAGNOSIS — R05.9 COUGH, UNSPECIFIED TYPE: ICD-10-CM

## 2022-12-14 DIAGNOSIS — J06.9 ACUTE URI: Primary | ICD-10-CM

## 2022-12-14 RX ORDER — AZITHROMYCIN 250 MG/1
TABLET, FILM COATED ORAL
Qty: 6 TABLET | Refills: 0 | Status: SHIPPED | OUTPATIENT
Start: 2022-12-14 | End: 2022-12-19

## 2022-12-14 RX ORDER — METHYLPREDNISOLONE 4 MG/1
TABLET ORAL
Qty: 1 DOSE PACK | Refills: 0 | Status: SHIPPED | OUTPATIENT
Start: 2022-12-14

## 2022-12-14 RX ORDER — BETAMETHASONE DIPROPIONATE 0.5 MG/G
SPRAY TOPICAL
COMMUNITY
Start: 2022-07-27

## 2022-12-14 NOTE — TELEPHONE ENCOUNTER
Outbound call to patient. Name and  verified, patient wanted to be seen today. Scheduled pt for 22 @ 1:30.

## 2022-12-14 NOTE — LETTER
12/14/2022    Ms. [address] [id number]      Ms. Trae Landis in under my care, she will return to work on 12/16/20233      Please call me if you have any questions: [phone]    Sincerely,      Pa Gordon NP

## 2022-12-14 NOTE — PROGRESS NOTES
[place] [facility] Note  Subjective:      Demi Silver is a 65 y.o. female who presents for nasal congestion, sore throat, headache and dry cough for 3 days. Cough is deep and waking her up at night. Using albuterol inhaler without help. Denies chills and fever. Past Medical History:   Diagnosis Date    Calculus of kidney     Hematuria 07/22/2021    Multiple pulmonary nodules 2018    Dr. Dodge Never pulmonologist    Other and unspecified hyperlipidemia 05/2009    high CRP    Unspecified essential hypertension      Past Surgical History:   Procedure Laterality Date    HX COLONOSCOPY  2013    Due 10 years    HX ANTONIO AND BSO  2005    for fibroids     Current Outpatient Medications   Medication Sig Dispense Refill    betamethasone dipropionate (Sernivo) 0.05 % sprp apply to neck as directed      methylPREDNISolone (MEDROL DOSEPACK) 4 mg tablet Use as directed 1 Dose Pack 0    azithromycin (ZITHROMAX) 250 mg tablet Take 2 tablets today, then take 1 tablet daily 6 Tablet 0    hydroCHLOROthiazide (HYDRODIURIL) 25 mg tablet Take 1 Tablet by mouth daily. Appointment due. 30 Tablet 0    fluticasone propionate (FLONASE) 50 mcg/actuation nasal spray 2 sprays to each nostril daily. 1 Each 5    Eucrisa 2 % oint       albuterol (PROVENTIL HFA, VENTOLIN HFA, PROAIR HFA) 90 mcg/actuation inhaler Take 1-2 Puffs by inhalation every four (4) hours as needed for Wheezing. 1 Inhaler 2    triamcinolone acetonide (KENALOG) 0.1 % topical cream Apply  to affected area two (2) times a day. use thin layer 454 g 0    multivitamin (ONE A DAY) tablet Take 1 Tab by mouth daily. ASPIRIN/ACETAMINOPHEN/CAFFEINE (EXCEDRIN MIGRAINE PO) Take  by mouth as needed. diclofenac EC (VOLTAREN) 75 mg EC tablet Take 1 Tablet by mouth two (2) times a day. (Patient not taking: Reported on 12/14/2022) 30 Tablet 0    montelukast (SINGULAIR) 10 mg tablet Take 1 Tablet by mouth daily.  (Patient not taking: Reported on 12/14/2022) 30 Tablet 5    B.animalis,bifid,infantis,long 10-15 mg TbEC Take  by mouth. (Patient not taking: No sig reported)       Allergies   Allergen Reactions    Bactrim [Sulfamethoprim] Other (comments)    Penicillins Unknown (comments)       ROS:   Complete review of systems was reviewed with pertinent information listed in HPI. Review of Systems   Constitutional: Negative. HENT:  Positive for congestion, sinus pain and sore throat. Eyes: Negative. Respiratory:  Positive for cough. Cardiovascular: Negative. Gastrointestinal: Negative. Genitourinary: Negative. Musculoskeletal: Negative. Neurological:  Positive for headaches. Objective:   Visit Vitals  /75   Pulse 81   Temp 98.2 °F (36.8 °C) (Temporal)   Resp 16   Ht 5' 3\" (1.6 m)   Wt 176 lb (79.8 kg)   SpO2 98%   BMI 31.18 kg/m²       Vitals and Nurse Documentation reviewed. Physical Exam  Constitutional:       Appearance: Normal appearance. She is obese. HENT:      Nose: Congestion present. Mouth/Throat:      Mouth: Mucous membranes are moist.      Pharynx: Posterior oropharyngeal erythema present. Cardiovascular:      Rate and Rhythm: Normal rate and regular rhythm. Pulses: Normal pulses. Heart sounds: Normal heart sounds. No murmur heard. Pulmonary:      Effort: Pulmonary effort is normal.      Breath sounds: Normal breath sounds. Abdominal:      General: Bowel sounds are normal.      Palpations: Abdomen is soft. Musculoskeletal:      Cervical back: Normal range of motion and neck supple. Neurological:      Mental Status: She is alert. Psychiatric:         Mood and Affect: Mood normal.         Thought Content: Thought content normal.       Assessment/Plan:     Diagnoses and all orders for this visit:    1. Acute URI  -     azithromycin (ZITHROMAX) 250 mg tablet; Take 2 tablets today, then take 1 tablet daily    2.  Cough, unspecified type  -     methylPREDNISolone (MEDROL DOSEPACK) 4 mg tablet; Use as directed          Pt expressed understanding with the diagnosis and plan        Discussed expected course/resolution/complications of diagnosis in detail with patient. Medication risks/benefits/costs/interactions/alternatives discussed with patient. Pt was given an after visit summary which includes diagnoses, current medications & vitals.   Pt expressed understanding with the diagnosis and plan

## 2023-01-26 ENCOUNTER — OFFICE VISIT (OUTPATIENT)
Dept: FAMILY MEDICINE CLINIC | Age: 63
End: 2023-01-26
Payer: COMMERCIAL

## 2023-01-26 VITALS
WEIGHT: 176 LBS | DIASTOLIC BLOOD PRESSURE: 70 MMHG | OXYGEN SATURATION: 98 % | HEART RATE: 69 BPM | TEMPERATURE: 97.3 F | HEIGHT: 63 IN | RESPIRATION RATE: 16 BRPM | BODY MASS INDEX: 31.18 KG/M2 | SYSTOLIC BLOOD PRESSURE: 126 MMHG

## 2023-01-26 DIAGNOSIS — M25.461 PAIN AND SWELLING OF RIGHT KNEE: Primary | ICD-10-CM

## 2023-01-26 DIAGNOSIS — M25.561 PAIN AND SWELLING OF RIGHT KNEE: Primary | ICD-10-CM

## 2023-01-26 NOTE — PROGRESS NOTES
Chief Complaint   Patient presents with    Knee Swelling     Right with pain and swelling. 1. \"Have you been to the ER, urgent care clinic since your last visit? Hospitalized since your last visit? \" No    2. \"Have you seen or consulted any other health care providers outside of the [address] [place] [address] since your last visit? \" No     3. For patients aged 39-70: Has the patient had a colonoscopy / FIT/ Cologuard? Yes - no Care Gap present      If the patient is female:    4. For patients aged 41-77: Has the patient had a mammogram within the past 2 years? Yes - no Care Gap present      5. For patients aged 21-65: Has the patient had a pap smear?  NA - based on age or sex         3 most recent PHQ Screens 1/26/2023   Little interest or pleasure in doing things Not at all   Feeling down, depressed, irritable, or hopeless Not at all   Total Score PHQ 2 0       Health Maintenance Due   Topic Date Due    COVID-19 Vaccine (4 - Booster for Moderna series) 12/20/2021    Flu Vaccine (1) 08/01/2022

## 2023-01-26 NOTE — PROGRESS NOTES
HISTORY OF PRESENT ILLNESS  Deborah Guevara is a 65 y.o. female. HPI  C/o pain and swelling of right knee. Symptoms began spontaneously. Denies any recent injury or unusual/strenuous activity. 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 Has been taking ibuprofen and using topical analgesia with some temporary sx improvement. Patient Active Problem List   Diagnosis Code    Migraine G43.909    Essential hypertension I10    Obesity (BMI 30.0-34. 9) E66.9    Multiple pulmonary nodules R91.8    Mild intermittent asthma without complication T06.83    Abnormal CT lung screening R91.8    Perennial allergic rhinitis J30.89     Current Outpatient Medications   Medication Sig    hydroCHLOROthiazide (HYDRODIURIL) 25 mg tablet TAKE 1 TABLET BY MOUTH ONCE DAILY . APPOINTMENT REQUIRED FOR FUTURE REFILLS    betamethasone dipropionate (Sernivo) 0.05 % sprp apply to neck as directed    fluticasone propionate (FLONASE) 50 mcg/actuation nasal spray 2 sprays to each nostril daily. Eucrisa 2 % oint     triamcinolone acetonide (KENALOG) 0.1 % topical cream Apply  to affected area two (2) times a day. use thin layer    multivitamin (ONE A DAY) tablet Take 1 Tab by mouth daily. ASPIRIN/ACETAMINOPHEN/CAFFEINE (EXCEDRIN MIGRAINE PO) Take  by mouth as needed. montelukast (SINGULAIR) 10 mg tablet Take 1 Tablet by mouth daily. (Patient not taking: No sig reported)     No current facility-administered medications for this visit. Social History     Tobacco Use    Smoking status: Never     Passive exposure: Past    Smokeless tobacco: Never   Vaping Use    Vaping Use: Never used   Substance Use Topics    Alcohol use: No     Comment: rare    Drug use: No     Blood pressure 126/70, pulse 69, temperature 97.3 °F (36.3 °C), temperature source Temporal, resp. rate 16, height 5' 3\" (1.6 m), weight 176 lb (79.8 kg), SpO2 98 %. Review of Systems   Musculoskeletal:  Positive for joint pain.    All other systems reviewed and are negative. Physical Exam  Constitutional:       General: She is not in acute distress. Musculoskeletal:      Right knee: Swelling (medial joint line) present. Normal range of motion. Tenderness present over the medial joint line. No LCL laxity, MCL laxity, ACL laxity or PCL laxity. Normal pulse. Left knee: Normal.   Skin:     General: Skin is warm and dry. Neurological:      Mental Status: She is alert. Gait: Gait normal.       ASSESSMENT and PLAN  Diagnoses and all orders for this visit:    1. Pain and swelling of right knee  -     XR KNEE RT 3 V; Future  Possible meniscal etiology due to large amount of swelling. Keep elevated AMAP. Continue ibuprofen 600mg tid prn. Ice x 15 minutes tid. Continue topical analgesia. Avoid strenuous, high impact activity, prolonged ambulation. Ortho referral if sx DNI. Follow up prn. We discussed the expected course, resolution and complications of the diagnosis in detail. Medication risks, benefits, costs, interactions and side effects were discussed. The patient was advised to contact the office for worsening of condition or FTI as anticipated. The patient expressed understanding of the diagnosis and plan.

## 2023-03-22 ENCOUNTER — VIRTUAL VISIT (OUTPATIENT)
Dept: FAMILY MEDICINE CLINIC | Age: 63
End: 2023-03-22
Payer: COMMERCIAL

## 2023-03-22 DIAGNOSIS — U07.1 COVID-19: Primary | ICD-10-CM

## 2023-03-22 PROCEDURE — 99214 OFFICE O/P EST MOD 30 MIN: CPT | Performed by: STUDENT IN AN ORGANIZED HEALTH CARE EDUCATION/TRAINING PROGRAM

## 2023-03-22 RX ORDER — CETIRIZINE HYDROCHLORIDE 10 MG/1
CAPSULE, LIQUID FILLED ORAL
COMMUNITY

## 2023-03-22 NOTE — LETTER
WORK NOTE    3/22/2023 10:08 AM    [address] [id number]      To Whom It May Concern:    Jose Luis Díaz is currently under the care of [facility]. She should be excused from work from 3/20 - 3/24/2023 and may return to work on 3/27/2023 wearing a mask through 3/30/2023. If there are questions or concerns please have the patient contact our office.         Sincerely,      Amy Muñiz MD

## 2023-03-22 NOTE — Clinical Note
Patient coming in to get work note if you could please print that. Additionally she has an appointment with [facility] for tomorrow, 3/23/23, if you could reschedule that for her sometime within the next month. Thank you!

## 2023-03-22 NOTE — PROGRESS NOTES
Chanel Walls  65 y.o. female  1960  [address] [place] [address]  [phone]   [facility]  Telemedicine Progress Note  Yash Elizabeth MD       Encounter Date and Time: March 22, 2023 at 8:43 AM    Consent:  She and/or the health care decision maker is aware that that she may receive a bill for this telephone service, depending on her insurance coverage, and has provided verbal consent to proceed: Yes    Chief Complaint   Patient presents with    Positive For Covid-19     History of Present Illness   Chanel Walls is a 65 y.o. female was evaluated by synchronous (real-time) audio-video technology from home, through the [facility] Portal.    COVID-19  Started with symptoms initially with cough 3 days ago. She did have chills, HA initially. She started taking mucinex DM and had steroids from previous episodes of bronchitis At Home COVID test positive one day ago. 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 Denies chest pain and pressure, no SOB, dizziness nor confusion. Review of Systems   Review of Systems   Constitutional:  Positive for chills, fever and malaise/fatigue. HENT:  Positive for congestion. Negative for sore throat. Respiratory:  Positive for cough. Negative for shortness of breath, wheezing and stridor. Cardiovascular:  Negative for chest pain and palpitations. Neurological:  Negative for dizziness and headaches.      Vitals/Objective:     General: alert, fatigued, cooperative, no distress   Mental  status: mental status: alert, oriented to person, place, and time, normal mood, behavior, speech, dress, motor activity, and thought processes   Resp: resp: normal effort, no respiratory distress, no accessory muscle use, and no coughing during duration of visit and speaking easily in complete sentences    Neuro: neuro: no gross deficits   Skin: skin: no discoloration or lesions of concern on visible areas   Due to this being a TeleHealth evaluation, many elements of the physical examination are unable to be assessed. Assessment and Plan:     65 y.o. fully vaccinated and boosted female on day 3 of illness with COVID 19. At risk for severe illness given age and multiple comorbidities and requesting antiviral therapy. 1. COVID-19  - nirmatrelvir-ritonavir (PAXLOVID) 300 mg (150 mg x 2)-100 mg; Take as instructed on dosepack  Dispense: 1 Box; Refill: 0  - flonase, mucinex for congestion  - robutussin, dayquil, nyquil for cough  - tylenol, ibuprofen for fever/myalgias  - cough drops and warm tea for sore throat  - reviewed expected course of illness and ED precuations  - advised on quarantine guidelines including need to retest and restart quarantine if having recurrent symptoms following paxlovid course  - work note provided as well as visit summary via [place]       We discussed the expected course, resolution and complications of the diagnosis(es) in detail. Medication risks, benefits, costs, interactions, and alternatives were discussed as indicated. I advised her to contact the office if her condition worsens, changes or fails to improve as anticipated. She expressed understanding with the diagnosis(es) and plan. Patient understands that this encounter was a temporary measure, and the importance of further follow up and examination was emphasized. Patient verbalized understanding. Patient informed to follow up: Follow-up and Dispositions    Return for routine care with Mani Osorio (r/s 3/23 visit 2/2 COVID).          Electronically Signed: Carola Thompson MD    Providers location when delivering service: home     Pursuant to the emergency declaration under the [facility] waiver authority and the [employer] Act, this Virtual  Visit was conducted, with patient's consent, to reduce the patient's risk of exposure to COVID-19 and provide continuity of care for an established patient. Services were provided through a video synchronous discussion virtually to substitute for in-person clinic visit. History   Patients past medical, surgical and family histories were reviewed and updated.       Past Medical History:   Diagnosis Date    Calculus of kidney     Hematuria 07/22/2021    Multiple pulmonary nodules 2018    Dr. Giorgi Uriarte pulmonologist    Other and unspecified hyperlipidemia 05/2009    high CRP    Unspecified essential hypertension      Past Surgical History:   Procedure Laterality Date    HX COLONOSCOPY  2013    Due 10 years    HX ANTONIO AND BSO  2005    for fibroids     Family History   Problem Relation Age of Onset    Elevated Lipids Mother     Hypertension Mother     Stroke Mother     Migraines Father     Diabetes Paternal Aunt     Heart Disease Paternal Uncle         CAD     Social History     Socioeconomic History    Marital status:      Spouse name: Not on file    Number of children: Not on file    Years of education: Not on file    Highest education level: Not on file   Occupational History    Occupation:    Tobacco Use    Smoking status: Never     Passive exposure: Past    Smokeless tobacco: Never   Vaping Use    Vaping Use: Never used   Substance and Sexual Activity    Alcohol use: No     Comment: rare    Drug use: No    Sexual activity: Not on file   Other Topics Concern     Service Not Asked    Blood Transfusions Not Asked    Caffeine Concern Not Asked    Occupational Exposure Not Asked    Hobby Hazards Not Asked    Sleep Concern Not Asked    Stress Concern Not Asked    Weight Concern Not Asked    Special Diet Not Asked    Back Care Not Asked    Exercise Yes     Comment: walking    Bike Helmet Not Asked    Seat Belt Not Asked    Self-Exams Not Asked   Social History Narrative    Not on file     Social Determinants of Health     Financial Resource Strain: Medium Risk    Difficulty of Paying Living Expenses: Somewhat hard   Food Insecurity: Food Insecurity Present    Worried About Running Out of Food in the Last Year: Sometimes true    Ran Out of Food in the Last Year: Sometimes true   Transportation Needs: Not on file   Physical Activity: Not on file   Stress: Not on file   Social Connections: Not on file   Intimate Partner Violence: Not on file   Housing Stability: Not on file     Patient Active Problem List   Diagnosis Code    Migraine G43.909    Essential hypertension I10    Obesity (BMI 30.0-34. 9) E66.9    Multiple pulmonary nodules R91.8    Mild intermittent asthma without complication G33.11    Abnormal CT lung screening R91.8    Perennial allergic rhinitis J30.89          Current Medications/Allergies   Medications and Allergies reviewed:    Current Outpatient Medications   Medication Sig Dispense Refill    Cetirizine (ZyrTEC) 10 mg cap Take  by mouth. nirmatrelvir-ritonavir (PAXLOVID) 300 mg (150 mg x 2)-100 mg Take as instructed on dosepack 1 Box 0    hydroCHLOROthiazide (HYDRODIURIL) 25 mg tablet Take 1 Tablet by mouth daily. Appointment due. 30 Tablet 0    betamethasone dipropionate (Sernivo) 0.05 % sprp apply to neck as directed      fluticasone propionate (FLONASE) 50 mcg/actuation nasal spray 2 sprays to each nostril daily. 1 Each 5    Eucrisa 2 % oint       triamcinolone acetonide (KENALOG) 0.1 % topical cream Apply  to affected area two (2) times a day. use thin layer (Patient not taking: Reported on 3/22/2023) 454 g 0    multivitamin (ONE A DAY) tablet Take 1 Tab by mouth daily. (Patient not taking: Reported on 3/22/2023)      ASPIRIN/ACETAMINOPHEN/CAFFEINE (EXCEDRIN MIGRAINE PO) Take  by mouth as needed.  (Patient not taking: Reported on 3/22/2023)       Allergies   Allergen Reactions    Bactrim [Sulfamethoprim] Other (comments)    Penicillins Unknown (comments)

## 2023-05-23 RX ORDER — DICLOFENAC SODIUM 75 MG/1
TABLET, DELAYED RELEASE ORAL
Qty: 30 TABLET | Refills: 0 | Status: SHIPPED | OUTPATIENT
Start: 2023-05-23

## 2023-06-05 RX ORDER — HYDROCHLOROTHIAZIDE 25 MG/1
TABLET ORAL
Qty: 30 TABLET | Refills: 0 | Status: SHIPPED | OUTPATIENT
Start: 2023-06-05

## 2023-06-09 RX ORDER — ALBUTEROL SULFATE 90 UG/1
AEROSOL, METERED RESPIRATORY (INHALATION)
Qty: 7 EACH | Refills: 0 | Status: SHIPPED | OUTPATIENT
Start: 2023-06-09

## 2023-06-27 RX ORDER — HYDROCHLOROTHIAZIDE 25 MG/1
TABLET ORAL
Qty: 30 TABLET | Refills: 0 | Status: SHIPPED | OUTPATIENT
Start: 2023-06-27

## 2023-08-15 RX ORDER — HYDROCHLOROTHIAZIDE 25 MG/1
TABLET ORAL
Qty: 30 TABLET | Refills: 0 | Status: SHIPPED | OUTPATIENT
Start: 2023-08-15

## 2023-09-12 ENCOUNTER — OFFICE VISIT (OUTPATIENT)
Dept: PRIMARY CARE CLINIC | Facility: CLINIC | Age: 63
End: 2023-09-12
Payer: COMMERCIAL

## 2023-09-12 VITALS
RESPIRATION RATE: 16 BRPM | SYSTOLIC BLOOD PRESSURE: 153 MMHG | BODY MASS INDEX: 31.43 KG/M2 | DIASTOLIC BLOOD PRESSURE: 74 MMHG | HEIGHT: 63 IN | HEART RATE: 51 BPM | OXYGEN SATURATION: 97 % | TEMPERATURE: 97.5 F | WEIGHT: 177.4 LBS

## 2023-09-12 DIAGNOSIS — Z12.11 SCREEN FOR COLON CANCER: Primary | ICD-10-CM

## 2023-09-12 DIAGNOSIS — Z12.31 ENCOUNTER FOR SCREENING MAMMOGRAM FOR MALIGNANT NEOPLASM OF BREAST: ICD-10-CM

## 2023-09-12 DIAGNOSIS — I10 ESSENTIAL (PRIMARY) HYPERTENSION: ICD-10-CM

## 2023-09-12 DIAGNOSIS — Z13.1 SCREENING FOR DIABETES MELLITUS: ICD-10-CM

## 2023-09-12 DIAGNOSIS — Z00.00 WELL WOMAN EXAM (NO GYNECOLOGICAL EXAM): ICD-10-CM

## 2023-09-12 PROCEDURE — 99396 PREV VISIT EST AGE 40-64: CPT | Performed by: FAMILY MEDICINE

## 2023-09-12 PROCEDURE — 3078F DIAST BP <80 MM HG: CPT | Performed by: FAMILY MEDICINE

## 2023-09-12 PROCEDURE — 3077F SYST BP >= 140 MM HG: CPT | Performed by: FAMILY MEDICINE

## 2023-09-12 RX ORDER — ALBUTEROL SULFATE 90 UG/1
2 AEROSOL, METERED RESPIRATORY (INHALATION) EVERY 4 HOURS PRN
Qty: 2 EACH | Refills: 3 | Status: SHIPPED | OUTPATIENT
Start: 2023-09-12

## 2023-09-12 RX ORDER — HYDROCHLOROTHIAZIDE 25 MG/1
25 TABLET ORAL DAILY
Qty: 90 TABLET | Refills: 1 | Status: SHIPPED | OUTPATIENT
Start: 2023-09-12

## 2023-09-12 RX ORDER — MONTELUKAST SODIUM 10 MG/1
10 TABLET ORAL DAILY
Qty: 90 TABLET | Refills: 3 | Status: SHIPPED | OUTPATIENT
Start: 2023-09-12

## 2023-09-12 SDOH — ECONOMIC STABILITY: FOOD INSECURITY: WITHIN THE PAST 12 MONTHS, THE FOOD YOU BOUGHT JUST DIDN'T LAST AND YOU DIDN'T HAVE MONEY TO GET MORE.: PATIENT DECLINED

## 2023-09-12 SDOH — ECONOMIC STABILITY: HOUSING INSECURITY
IN THE LAST 12 MONTHS, WAS THERE A TIME WHEN YOU DID NOT HAVE A STEADY PLACE TO SLEEP OR SLEPT IN A SHELTER (INCLUDING NOW)?: PATIENT REFUSED

## 2023-09-12 SDOH — ECONOMIC STABILITY: INCOME INSECURITY: HOW HARD IS IT FOR YOU TO PAY FOR THE VERY BASICS LIKE FOOD, HOUSING, MEDICAL CARE, AND HEATING?: PATIENT DECLINED

## 2023-09-12 SDOH — ECONOMIC STABILITY: FOOD INSECURITY: WITHIN THE PAST 12 MONTHS, YOU WORRIED THAT YOUR FOOD WOULD RUN OUT BEFORE YOU GOT MONEY TO BUY MORE.: PATIENT DECLINED

## 2023-09-12 ASSESSMENT — PATIENT HEALTH QUESTIONNAIRE - PHQ9
1. LITTLE INTEREST OR PLEASURE IN DOING THINGS: 0
SUM OF ALL RESPONSES TO PHQ QUESTIONS 1-9: 0
SUM OF ALL RESPONSES TO PHQ9 QUESTIONS 1 & 2: 0
SUM OF ALL RESPONSES TO PHQ QUESTIONS 1-9: 0
2. FEELING DOWN, DEPRESSED OR HOPELESS: 0

## 2023-09-13 DIAGNOSIS — I10 ESSENTIAL (PRIMARY) HYPERTENSION: ICD-10-CM

## 2023-09-13 DIAGNOSIS — Z00.00 WELL WOMAN EXAM (NO GYNECOLOGICAL EXAM): ICD-10-CM

## 2023-09-13 DIAGNOSIS — Z13.1 SCREENING FOR DIABETES MELLITUS: ICD-10-CM

## 2023-09-13 LAB
ALBUMIN SERPL-MCNC: 3.8 G/DL (ref 3.5–5)
ALBUMIN/GLOB SERPL: 1.1 (ref 1.1–2.2)
ALP SERPL-CCNC: 126 U/L (ref 45–117)
ALT SERPL-CCNC: 33 U/L (ref 12–78)
ANION GAP SERPL CALC-SCNC: 3 MMOL/L (ref 5–15)
AST SERPL-CCNC: 22 U/L (ref 15–37)
BILIRUB SERPL-MCNC: 0.4 MG/DL (ref 0.2–1)
BUN SERPL-MCNC: 15 MG/DL (ref 6–20)
BUN/CREAT SERPL: 22 (ref 12–20)
CALCIUM SERPL-MCNC: 9.5 MG/DL (ref 8.5–10.1)
CHLORIDE SERPL-SCNC: 105 MMOL/L (ref 97–108)
CHOLEST SERPL-MCNC: 225 MG/DL
CO2 SERPL-SCNC: 32 MMOL/L (ref 21–32)
CREAT SERPL-MCNC: 0.67 MG/DL (ref 0.55–1.02)
EST. AVERAGE GLUCOSE BLD GHB EST-MCNC: 123 MG/DL
GLOBULIN SER CALC-MCNC: 3.4 G/DL (ref 2–4)
GLUCOSE SERPL-MCNC: 92 MG/DL (ref 65–100)
HBA1C MFR BLD: 5.9 % (ref 4–5.6)
HDLC SERPL-MCNC: 43 MG/DL
HDLC SERPL: 5.2 (ref 0–5)
LDLC SERPL CALC-MCNC: 144.8 MG/DL (ref 0–100)
POTASSIUM SERPL-SCNC: 3.5 MMOL/L (ref 3.5–5.1)
PROT SERPL-MCNC: 7.2 G/DL (ref 6.4–8.2)
SODIUM SERPL-SCNC: 140 MMOL/L (ref 136–145)
TRIGL SERPL-MCNC: 186 MG/DL
VLDLC SERPL CALC-MCNC: 37.2 MG/DL

## 2023-09-15 RX ORDER — ATORVASTATIN CALCIUM 20 MG/1
20 TABLET, FILM COATED ORAL DAILY
Qty: 90 TABLET | Refills: 1 | Status: SHIPPED | OUTPATIENT
Start: 2023-09-15

## 2023-09-25 ENCOUNTER — PATIENT MESSAGE (OUTPATIENT)
Dept: PRIMARY CARE CLINIC | Facility: CLINIC | Age: 63
End: 2023-09-25

## 2023-09-25 DIAGNOSIS — E78.2 MIXED HYPERLIPIDEMIA: Primary | ICD-10-CM

## 2023-09-28 NOTE — TELEPHONE ENCOUNTER
From: Aleta West  To: Dr. Rodgers Ek: 9/25/2023 7:27 PM EDT  Subject: Some side affect from medication     Hi Dr. Philly Ag, I'm taking the medication Atorvastatin,and I've noticed yellowish coloring of my skin! I no longer want to continue taking it! Is there any advice! Thank you, Aleta West.

## 2023-10-02 DIAGNOSIS — E78.2 MIXED HYPERLIPIDEMIA: ICD-10-CM

## 2023-10-02 LAB
ALBUMIN SERPL-MCNC: 3.7 G/DL (ref 3.5–5)
ALBUMIN/GLOB SERPL: 1.1 (ref 1.1–2.2)
ALP SERPL-CCNC: 135 U/L (ref 45–117)
ALT SERPL-CCNC: 36 U/L (ref 12–78)
ANION GAP SERPL CALC-SCNC: 3 MMOL/L (ref 5–15)
AST SERPL-CCNC: 27 U/L (ref 15–37)
BILIRUB SERPL-MCNC: 0.4 MG/DL (ref 0.2–1)
BUN SERPL-MCNC: 14 MG/DL (ref 6–20)
BUN/CREAT SERPL: 23 (ref 12–20)
CALCIUM SERPL-MCNC: 9.4 MG/DL (ref 8.5–10.1)
CHLORIDE SERPL-SCNC: 107 MMOL/L (ref 97–108)
CO2 SERPL-SCNC: 31 MMOL/L (ref 21–32)
CREAT SERPL-MCNC: 0.6 MG/DL (ref 0.55–1.02)
GLOBULIN SER CALC-MCNC: 3.5 G/DL (ref 2–4)
GLUCOSE SERPL-MCNC: 94 MG/DL (ref 65–100)
POTASSIUM SERPL-SCNC: 3.6 MMOL/L (ref 3.5–5.1)
PROT SERPL-MCNC: 7.2 G/DL (ref 6.4–8.2)
SODIUM SERPL-SCNC: 141 MMOL/L (ref 136–145)

## 2023-10-10 ENCOUNTER — TELEPHONE (OUTPATIENT)
Dept: PRIMARY CARE CLINIC | Facility: CLINIC | Age: 63
End: 2023-10-10

## 2023-10-10 DIAGNOSIS — R74.8 ELEVATED ALKALINE PHOSPHATASE LEVEL: Primary | ICD-10-CM

## 2023-10-10 NOTE — TELEPHONE ENCOUNTER
Spoke to patient reviewed labs. She is still concerned about liver results and would like a call back or my chart message sent by Dr. Javier Estrella.

## 2023-11-09 ENCOUNTER — NURSE TRIAGE (OUTPATIENT)
Dept: OTHER | Facility: CLINIC | Age: 63
End: 2023-11-09

## 2023-11-09 NOTE — TELEPHONE ENCOUNTER
[facility] [place] [facility]    Received call from [facility] with [employer]. Subjective: Caller states \" BP is fluctuating. \"     Current Symptoms: At work yesterday was lightheaded, 160/80 just stood still and chcked again and it was 179/81. Today the last BP was 134/70. Lightheaded off and on for a few months. Denies CP. \"Carpal tunnel syndrome so my hands go numb sometimes, both hands, comes and goes. \"    \"Yesterday right eye got blurry for a few seconds, no blurry vision today, I felt like something might have been in my eye. \"    Take BP medications and taking appropriately. \"A little dizziness this morning when I got up. \"    Onset: a few months ago; waxing and waning    Associated Symptoms: NA    Pain Severity: 0/10; N/A; none    Temperature: Denies     What has been tried: Drinking more water. Recommended disposition: See PCP within 24 Hours    Care advice provided, patient verbalizes understanding; denies any other questions or concerns; instructed to call back for any new or worsening symptoms. Patient/Caller agrees with recommended disposition; writer provided warm transfer to [facility] for appointment scheduling    Attention Provider: Thank you for allowing me to participate in the care of your patient. The patient was connected to triage in response to information provided to the ECC/PSC. Please do not respond through this encounter as the response is not directed to a shared pool.       Reason for Disposition   Taking a medicine that could cause dizziness (e.g., blood pressure medications, diuretics)    Protocols used: Dizziness - Lightheadedness-ADULT-

## 2023-11-10 ENCOUNTER — TELEPHONE (OUTPATIENT)
Dept: PRIMARY CARE CLINIC | Facility: CLINIC | Age: 63
End: 2023-11-10

## 2023-11-10 ENCOUNTER — HOSPITAL ENCOUNTER (EMERGENCY)
Facility: HOSPITAL | Age: 63
Discharge: HOME | End: 2023-11-10
Attending: STUDENT IN AN ORGANIZED HEALTH CARE EDUCATION/TRAINING PROGRAM
Payer: COMMERCIAL

## 2023-11-10 VITALS
OXYGEN SATURATION: 94 % | BODY MASS INDEX: 31.37 KG/M2 | TEMPERATURE: 97.7 F | DIASTOLIC BLOOD PRESSURE: 83 MMHG | HEIGHT: 63 IN | SYSTOLIC BLOOD PRESSURE: 156 MMHG | RESPIRATION RATE: 18 BRPM | WEIGHT: 177.03 LBS | HEART RATE: 70 BPM

## 2023-11-10 DIAGNOSIS — I10 ASYMPTOMATIC HYPERTENSION: Primary | ICD-10-CM

## 2023-11-10 DIAGNOSIS — R51.9 ACUTE NONINTRACTABLE HEADACHE, UNSPECIFIED HEADACHE TYPE: ICD-10-CM

## 2023-11-10 PROCEDURE — 96372 THER/PROPH/DIAG INJ SC/IM: CPT

## 2023-11-10 PROCEDURE — 6370000000 HC RX 637 (ALT 250 FOR IP): Performed by: STUDENT IN AN ORGANIZED HEALTH CARE EDUCATION/TRAINING PROGRAM

## 2023-11-10 PROCEDURE — 6360000002 HC RX W HCPCS: Performed by: STUDENT IN AN ORGANIZED HEALTH CARE EDUCATION/TRAINING PROGRAM

## 2023-11-10 PROCEDURE — 99284 EMERGENCY DEPT VISIT MOD MDM: CPT

## 2023-11-10 RX ORDER — KETOROLAC TROMETHAMINE 30 MG/ML
30 INJECTION, SOLUTION INTRAMUSCULAR; INTRAVENOUS
Status: COMPLETED | OUTPATIENT
Start: 2023-11-10 | End: 2023-11-10

## 2023-11-10 RX ORDER — HYDROCHLOROTHIAZIDE 25 MG/1
25 TABLET ORAL ONCE
Status: COMPLETED | OUTPATIENT
Start: 2023-11-10 | End: 2023-11-10

## 2023-11-10 RX ADMIN — KETOROLAC TROMETHAMINE 30 MG: 30 INJECTION, SOLUTION INTRAMUSCULAR; INTRAVENOUS at 20:49

## 2023-11-10 RX ADMIN — HYDROCHLOROTHIAZIDE 25 MG: 25 TABLET ORAL at 20:49

## 2023-11-10 ASSESSMENT — PAIN - FUNCTIONAL ASSESSMENT: PAIN_FUNCTIONAL_ASSESSMENT: NONE - DENIES PAIN

## 2023-11-10 NOTE — TELEPHONE ENCOUNTER
Called pt to see what exactly was going on and she told me blurry vision and high bp. Discussed with pt that if her vision gets blurry again she needs to go to the ER IMMEDIATELY. Bozena Molina would rather August Moy see the pt on Monday because of her concerns. Sending a message to Denise Kapoor and August Moy about it currently.

## 2023-11-11 LAB
EKG ATRIAL RATE: 63 BPM
EKG DIAGNOSIS: NORMAL
EKG P AXIS: 49 DEGREES
EKG P-R INTERVAL: 164 MS
EKG Q-T INTERVAL: 434 MS
EKG QRS DURATION: 96 MS
EKG QTC CALCULATION (BAZETT): 444 MS
EKG R AXIS: 10 DEGREES
EKG T AXIS: 18 DEGREES
EKG VENTRICULAR RATE: 63 BPM

## 2023-11-11 NOTE — ED TRIAGE NOTES
[facility] Nursing Note        Patient Name: Roscoe Macdonald      : 1960             MRN: [id number]      Chief Complaint:  Hypertension      Admit Diagnosis: No admission diagnoses are documented for this encounter. Admitting Provider: No admitting provider for patient encounter. Surgery: * No surgery found *           Patient arrived to the ER ambulatory from home with complaints of High BP and a headache that started a week ago. Pt discloses a Hx of Asthma, Left Carpal Tunnel Syndrome & a Chronic Left Shoulder Pain from Arthritis.          Lines:        Signed by: Gaye Lowe RN, TRACE, BSN, VIA [facility]                                              11/10/2023 at 7:46 PM

## 2023-11-11 NOTE — ED PROVIDER NOTES
[facility]  EMERGENCY DEPARTMENT ENCOUNTER      Pt Name: Missael Graham  MRN: [id number]  [address] 1960  Date of evaluation: 11/10/2023  Provider: Ravi Ng MD    CHIEF COMPLAINT       Chief Complaint   Patient presents with    Hypertension       HISTORY OF PRESENT ILLNESS    HPI    50-year-old female with history of hypertension, obesity, asthma presenting for elevated blood pressure. Patient reports for the last week whenever she checks her blood pressure as it is at least 932 systolic. Today she saw numbers up to 350 systolic so came to the emergency department. She denies any significant symptoms. Reports a mild bilateral throbbing headache. There are no associated vision changes, nausea, vomiting, numbness, tingling, weakness. No chest pain or shortness of breath. She has been taking her blood pressure medicine without issue. She does state that she has some chronic pain from the left shoulder and left wrist arthritis which may be raising her blood pressure. She also reports increased stress at work. She has been taking diclofenac with only mild relief. Nursing notes reviewed. REVIEW OF SYSTEMS     Review of Systems  Unless otherwise stated, a complete review of systems was asked of the patient. Pertinent positives are noted in the HPI section.     PAST MEDICAL HISTORY     Past Medical History:   Diagnosis Date    Asthma     Calculus of kidney     Hematuria 07/22/2021    Migraines     Multiple pulmonary nodules 2018    Dr. Katelyn Goins pulmonologist    Other and unspecified hyperlipidemia 05/2009    high CRP    Unspecified essential hypertension        SURGICAL HISTORY       Past Surgical History:   Procedure Laterality Date    COLONOSCOPY  2013    Due 10 years    KASSI AND BSO (CERVIX REMOVED)  2005    for fibroids       CURRENT MEDICATIONS       Discharge Medication List as of 11/10/2023  9:10 PM        CONTINUE these medications which have NOT CHANGED    Details This is a historical note converted from Joey. Formatting and pictures may have been removed.  Please reference Joey for original formatting and attached multimedia. Chief Complaint  Pt is here for a post op 3 level lumbar fusion done on ?2/5/19 with Dr. Conte in [place]. PT c/o right shoulder pain.  History of Present Illness  This patient?has had a lumbar fusion?done?February?and presents presents complaining?that he still has lower back pain.? He states he does not have radicular symptoms into his legs he has good strength in his legs.? He also is having some right shoulder discomfort.  ?  The patient has had a?low lumbar spine from 3 to S?1?with pelvic stabilization.? He states that occasionally has discomfort with turning over in rotation he does not want a?do any twisting activities although he does want to eventually play golf.? In the past he has been seen for?right shoulder rotator cuff arthropathy  Review of Systems  Review of Systems?  ?????Constitutional: ?No fever, No chills. ?  ?????Respiratory: ?No shortness of breath, No cough. ?  ?????Cardiovascular: ?No chest pain. ?  ?????Gastrointestinal: ?No nausea, No vomiting, No diarrhea, No constipation, No heartburn. ?  ?????Genitourinary: ?No dysuria, No hematuria. ?  ?????Hematology/Lymphatics: ?No bleeding tendency. ?  ?????Endocrine: ?No polyuria. ?  ?????Neurologic: ?Alert and oriented X4, No numbness, No tingling. ?  ?????Psychiatric: ?No anxiety, No depression. ?  ?????Integumentary: no abnormalities except as noted in history of present illness  Physical Exam  Vitals & Measurements  HR:?78(Peripheral)? BP:?132/65?  HT:?185?cm? WT:?92.98?kg? BMI:?28.92?  Examination pain as well as surgical scar he has no signs of inflammation erythema about the wound?he has?coarse limited flexion rotation of his back?he has no radicular pain into his buttocks. ?In sitting position his reflexes are 1+ at the knees are absent at the ankle he has normal sensation  across his lower legs?he has good quad strength and?tibialis???anterior function.  Reviewed x-rays of the lumbar spine which show multilevel lumbar fusion with good positioning of the hardware and appears to be early bone formation in between the vertebra and also in the lateral gutters.  ?  Patient is also right shoulder x-rays which revealed?a well-positioned humeral head?with?some signs of mild superior migration.  ?  The patient continue with his?swimming and light exercise program at 42 months repeat x-rays of his lumbar spine.  Assessment/Plan  1.?Rotator cuff arthropathy of right shoulder?M12.811  2.?DDD (degenerative disc disease), lumbar?M51.36  Orders:  XR Shoulder Right Minimum 2 Views, Routine, 08/13/19 8:45:00 CDT, Pain, None, Ambulatory, Rad Type, Right shoulder pain, 08/13/19 8:45:00 CDT  XR Spine Lumbar 2 or 3 Views, Routine, 08/13/19 8:44:00 CDT, Back Pain, None, Ambulatory, Rad Type, Fusion of lumbar spine, Not Scheduled, 08/13/19 8:44:00 CDT   Problem List/Past Medical History  Ongoing  DDD (degenerative disc disease), lumbar  High cholesterol  Hypertension  Osteoarthritis of left knee  Osteoarthritis of right knee  Partial tear of right rotator cuff  Patella-femoral syndrome  Pseudogout  Rotator cuff arthropathy of right shoulder  Historical  No qualifying data  Procedure/Surgical History  Colonoscopy (03/14/2017)  History of cervical spine fusion  LEFT SHOULDER DECOMPRESSION  LUMBAR SURGERY  PATELLA FEMORAL REPLACEMENT  RIGHT KNEE SURGER   Medications  atorvastatin 20 mg oral tablet, See Instructions, 3 refills  Cyclobenzaprine 10 Mg Tablet, 10 mg= 1 tab(s), Oral, TID  hydrochlorothiazide-olmesartan 12.5 mg-20 mg oral tablet, 1 tab(s), Oral, Daily, 2 refills  OXYCODONE-ACETAMINOPHEN , 1 tab(s), Oral, TID,? ?Not taking  triamcinolone 0.025% topical cream, 1 ray, TOP, BID, PRN  zolpidem 10 mg oral tablet, See Instructions, 5 refills  ZyrTEC, Daily  Allergies  No Known Allergies  No Known  Medication Allergies  Social History  Abuse/Neglect  No, 08/13/2019  Alcohol  Past, Wine, Daily, 01/25/2018  Employment/School  Employed, 05/23/2017  Home/Environment  Lives with Alone. Living situation: Home/Independent., 05/23/2017  Substance Use  Never, 05/23/2017  Tobacco  Never (less than 100 in lifetime), N/A, 08/13/2019  Health Maintenance  Health Maintenance  ???Pending?(in the next year)  ??? ??OverDue  ??? ? ? ?Advance Directive due??01/01/19??and every 1??year(s)  ??? ? ? ?Alcohol Misuse Screening due??01/01/19??and every 1??year(s)  ??? ? ? ?Cognitive Screening due??01/01/19??and every 1??year(s)  ??? ? ? ?Functional Assessment due??01/01/19??and every 1??year(s)  ??? ? ? ?Geriatric Depression Screening due??01/01/19??and every 1??year(s)  ??? ? ? ?Tetanus Vaccine due??03/16/19??and every 10??year(s)  ??? ??Due?  ??? ? ? ?ADL Screening due??08/13/19??and every 1??year(s)  ??? ? ? ?Hypertension Maintenance-Medication Prescribed due??08/13/19??and every 1??year(s)  ??? ? ? ?Hypertension Management-Education due??08/13/19??and every 1??year(s)  ??? ? ? ?Pneumococcal Vaccine due??08/13/19??Variable frequency  ??? ? ? ?Pneumococcal Vaccine due??08/13/19??and every?  ??? ? ? ?Zoster Vaccine due??08/13/19??and every 100??year(s)  ??? ??Due In Future?  ??? ? ? ?Fall Risk Assessment not due until??01/01/20??and every 1??year(s)  ??? ? ? ?Obesity Screening not due until??01/01/20??and every 1??year(s)  ??? ? ? ?Hypertension Management-BMP not due until??04/22/20??and every 1??year(s)  ??? ? ? ?Hypertension Management-Blood Pressure not due until??04/22/20??and every 1??year(s)  ??? ? ? ?Aspirin Therapy for CVD Prevention not due until??04/23/20??and every 1??year(s)  ???Satisfied?(in the past 1 year)  ??? ??Satisfied?  ??? ? ? ?Aspirin Therapy for CVD Prevention on??04/23/19.??Satisfied by Gayle Diaz LPN  ??? ? ? ?Blood Pressure Screening on??08/13/19.??Satisfied by Cassie Phillips LPN  ??? ? ? ?Body Mass  Index Check on??08/13/19.??Satisfied by Cassie Phillips LPN  ??? ? ? ?Depression Screening on??01/23/19.??Satisfied by Farzana Gardiner LPN  ??? ? ? ?Diabetes Screening on??04/23/19.??Satisfied by Beronica Anthony MT  ??? ? ? ?Fall Risk Assessment on??01/23/19.??Satisfied by Farzana Gardiner LPN  ??? ? ? ?Hypertension Management-BMP on??04/23/19.??Satisfied by Beronica Anthony MT  ??? ? ? ?Hypertension Management-Blood Pressure on??08/13/19.??Satisfied by Cassie Phillips LPN  ??? ? ? ?Lipid Screening on??01/28/19.??Satisfied by Wiliam Atwood  ??? ? ? ?Obesity Screening on??08/13/19.??Satisfied by Cassie Phillips LPN  ?      asymptomatic hypertension. Vitals showing initial blood pressure of 180, resolved to 142/74 without intervention. Given an extra dose of her home HCTZ. Otherwise vitals normal.  Patient neuro intact. No significant symptoms to suggest ACS, hypertensive emergency. No red flag symptoms to suggest intracranial hemorrhage or mass lesion. EKG reassuring. Patient stable for discharge with PCP follow-up for further blood pressure management. Amount and/or Complexity of Data Reviewed  Independent Historian: spouse  ECG/medicine tests: ordered. Decision-making details documented in ED Course. Risk  Prescription drug management. REASSESSMENT     ED Course as of 11/11/23 0034   Fri Nov 10, 2023   2107 EKG 12 Lead  NSR, rate 63. Normal axis. Flipped T wave V2, no prior to compare. No STEMI.  [AS]   2107 Nonspecific T wave change on her EKG. In the absence of any shortness of breath or chest pain unlikely to reflect true acute cardiac injury. This could be a chronic finding. She is essentially asymptomatic here. Blood pressure has improved. Stable for discharge with strict return precautions and PCP follow-up. [AS]      ED Course User Index  [AS] Corby Lindsey MD       CONSULTS:  None    PROCEDURES:  Procedures    FINAL IMPRESSION      1. Asymptomatic hypertension    2.  Acute nonintractable headache, unspecified headache type          DISPOSITION/PLAN   DISPOSITION Decision To Discharge 11/10/2023 09:08:40 PM    (Please note that portions of this note were completed with a voice recognition program.  Efforts were made to edit the dictations but occasionally words are mis-transcribed.)    Corby Lindsey MD (electronically signed)  Emergency Attending Physician      Corby Lindsey MD  11/11/23 [id number]

## 2023-11-11 NOTE — DISCHARGE INSTRUCTIONS
Continue your medications for arthritis pain as directed. Continue her blood pressure medication as previously prescribed. Follow-up as soon as possible with your primary care doctor to further discuss any changes to your medications. Your EKG did not show any signs of an acute heart attack or other emergency. It did show a nonspecific finding called a flipped T wave, which may be normal but should be reevaluated. Discuss this with your primary doctor to see if any further testing is necessary.

## 2023-11-11 NOTE — ED NOTES
Patient is discharged home. Alert, oriented, and ambulatory. Patient is in no distress. Education given regarding follow up and medication. Patient verbalizes understanding.        Dorian Saleh RN  11/10/23 [id number]

## 2023-11-13 ENCOUNTER — OFFICE VISIT (OUTPATIENT)
Dept: PRIMARY CARE CLINIC | Facility: CLINIC | Age: 63
End: 2023-11-13
Payer: COMMERCIAL

## 2023-11-13 ENCOUNTER — TELEPHONE (OUTPATIENT)
Dept: PRIMARY CARE CLINIC | Facility: CLINIC | Age: 63
End: 2023-11-13

## 2023-11-13 ENCOUNTER — HOSPITAL ENCOUNTER (OUTPATIENT)
Facility: HOSPITAL | Age: 63
Discharge: HOME | End: 2023-11-16
Attending: FAMILY MEDICINE
Payer: COMMERCIAL

## 2023-11-13 VITALS
RESPIRATION RATE: 18 BRPM | HEIGHT: 63 IN | DIASTOLIC BLOOD PRESSURE: 84 MMHG | OXYGEN SATURATION: 98 % | BODY MASS INDEX: 30.83 KG/M2 | WEIGHT: 174 LBS | SYSTOLIC BLOOD PRESSURE: 171 MMHG | TEMPERATURE: 97.8 F | HEART RATE: 54 BPM

## 2023-11-13 DIAGNOSIS — M25.512 ACUTE PAIN OF LEFT SHOULDER: ICD-10-CM

## 2023-11-13 DIAGNOSIS — M25.512 ACUTE PAIN OF LEFT SHOULDER: Primary | ICD-10-CM

## 2023-11-13 DIAGNOSIS — I10 ESSENTIAL (PRIMARY) HYPERTENSION: ICD-10-CM

## 2023-11-13 PROCEDURE — 3077F SYST BP >= 140 MM HG: CPT | Performed by: FAMILY MEDICINE

## 2023-11-13 PROCEDURE — 73030 X-RAY EXAM OF SHOULDER: CPT

## 2023-11-13 PROCEDURE — 3079F DIAST BP 80-89 MM HG: CPT | Performed by: FAMILY MEDICINE

## 2023-11-13 PROCEDURE — 99214 OFFICE O/P EST MOD 30 MIN: CPT | Performed by: FAMILY MEDICINE

## 2023-11-13 RX ORDER — AMLODIPINE BESYLATE 5 MG/1
5 TABLET ORAL DAILY
Qty: 30 TABLET | Refills: 0 | Status: SHIPPED | OUTPATIENT
Start: 2023-11-13

## 2023-11-13 NOTE — TELEPHONE ENCOUNTER
Called pt to let her know June Teo will see her as long as she is okay with waiting a little bit. When calling pt she kept getting cut off due to the area she is in. Call ended up dropping. Called pt back a 2nd time and her service was better. Was able to relay the message. Name and  verified.

## 2023-11-13 NOTE — PROGRESS NOTES
Gisela Ciu is an 65 y.o. female who presents for follow up. Pmhx : migraine, HTN, HLD, asthma, eczema. HTN. On HCTZ daily. Recently bp has been elevated. Asthma. Has never had pfts performed. Using albuterol prn, 1-2 days per week. On zyrtect and singulair for environmental allergies. Hx migraines with aura, occurs about 1-2 times per month. Sx relieved by excedrin migraine. Eczema, followed by derm. C/o L shoulder pains. Positional. Bothers her most when she sleeps at night. She's been using diclofenac for over a month. No injury. Allergies - reviewed: Allergies   Allergen Reactions    Penicillins      Other reaction(s): Unknown (comments)    Sulfamethoxazole-Trimethoprim Other (See Comments)         Medications - reviewed:   Current Outpatient Medications   Medication Sig    atorvastatin (LIPITOR) 20 MG tablet Take 1 tablet by mouth daily    albuterol sulfate HFA (PROVENTIL;VENTOLIN;PROAIR) 108 (90 Base) MCG/ACT inhaler Inhale 2 puffs into the lungs every 4 hours as needed for Wheezing    hydroCHLOROthiazide (HYDRODIURIL) 25 MG tablet Take 1 tablet by mouth daily    montelukast (SINGULAIR) 10 MG tablet Take 1 tablet by mouth daily    diclofenac (VOLTAREN) 75 MG EC tablet Take 1 tablet by mouth twice daily    Betamethasone Dipropionate (SERNIVO) 0.05 % EMUL apply to neck as directed    Crisaborole (EUCRISA) 2 % OINT ceived the following from [facility]: Outside name: Eucrisa 2 % oint    fluticasone (FLONASE) 50 MCG/ACT nasal spray 2 sprays to each nostril daily. No current facility-administered medications for this visit. ROS  CONSTITUTIONAL: Denies fever, chills, unintentional weight loss. CARDIOVASCULAR: Denies chest pain, orthopnea, PND. RESPIRATORY: Denies cough, dyspnea, wheezing, hemoptysis. GI: Denies abdominal pain, diarrhea, constipation, black or bloody stool.        Physical Exam  BP (!) 171/84   Pulse 54   Temp

## 2023-11-27 ENCOUNTER — HOSPITAL ENCOUNTER (OUTPATIENT)
Facility: HOSPITAL | Age: 63
Discharge: HOME | End: 2023-11-30
Attending: FAMILY MEDICINE
Payer: COMMERCIAL

## 2023-11-27 DIAGNOSIS — Z12.31 ENCOUNTER FOR SCREENING MAMMOGRAM FOR MALIGNANT NEOPLASM OF BREAST: ICD-10-CM

## 2023-11-27 PROCEDURE — 77063 BREAST TOMOSYNTHESIS BI: CPT

## 2023-12-05 ENCOUNTER — OFFICE VISIT (OUTPATIENT)
Dept: PRIMARY CARE CLINIC | Facility: CLINIC | Age: 63
End: 2023-12-05
Payer: COMMERCIAL

## 2023-12-05 VITALS
RESPIRATION RATE: 20 BRPM | SYSTOLIC BLOOD PRESSURE: 153 MMHG | DIASTOLIC BLOOD PRESSURE: 80 MMHG | OXYGEN SATURATION: 98 % | WEIGHT: 173 LBS | BODY MASS INDEX: 30.65 KG/M2 | HEIGHT: 63 IN | HEART RATE: 56 BPM | TEMPERATURE: 97.9 F

## 2023-12-05 DIAGNOSIS — J33.9 NASAL POLYP: ICD-10-CM

## 2023-12-05 DIAGNOSIS — I10 ESSENTIAL (PRIMARY) HYPERTENSION: Primary | ICD-10-CM

## 2023-12-05 PROCEDURE — 3077F SYST BP >= 140 MM HG: CPT | Performed by: INTERNAL MEDICINE

## 2023-12-05 PROCEDURE — 99213 OFFICE O/P EST LOW 20 MIN: CPT | Performed by: INTERNAL MEDICINE

## 2023-12-05 PROCEDURE — 3079F DIAST BP 80-89 MM HG: CPT | Performed by: INTERNAL MEDICINE

## 2023-12-05 RX ORDER — FLUTICASONE PROPIONATE 50 MCG
1 SPRAY, SUSPENSION (ML) NASAL DAILY
Qty: 32 G | Refills: 1 | Status: SHIPPED | OUTPATIENT
Start: 2023-12-05

## 2023-12-05 RX ORDER — VALSARTAN 40 MG/1
40 TABLET ORAL DAILY
Qty: 90 TABLET | Refills: 1 | Status: SHIPPED | OUTPATIENT
Start: 2023-12-05

## 2023-12-06 NOTE — PROGRESS NOTES
Adam Max is a 59 y.o.  female and presents with     Chief Complaint   Patient presents with    Ear Fullness     Feeling off balance since last thursday    Congestion     Patient complains of congestion in her nose. Also feels right ear clogged  Blood pressure is slightly elevated. She reports that systolic blood pressure is elevated  Has not started taking amlodipine yet      Past Medical History:   Diagnosis Date    Asthma     Calculus of kidney     Hematuria 07/22/2021    Migraines     Multiple pulmonary nodules 2018    Dr. Mika Billingsley pulmonologist    Other and unspecified hyperlipidemia 05/2009    high CRP    Unspecified essential hypertension      Past Surgical History:   Procedure Laterality Date    COLONOSCOPY  2013    Due 10 years    KASSI AND BSO (CERVIX REMOVED)  2005    for fibroids     Current Outpatient Medications   Medication Sig    valsartan (DIOVAN) 40 MG tablet Take 1 tablet by mouth daily    fluticasone (FLONASE) 50 MCG/ACT nasal spray 1 spray by Each Nostril route daily    atorvastatin (LIPITOR) 20 MG tablet Take 1 tablet by mouth daily    albuterol sulfate HFA (PROVENTIL;VENTOLIN;PROAIR) 108 (90 Base) MCG/ACT inhaler Inhale 2 puffs into the lungs every 4 hours as needed for Wheezing    hydroCHLOROthiazide (HYDRODIURIL) 25 MG tablet Take 1 tablet by mouth daily    montelukast (SINGULAIR) 10 MG tablet Take 1 tablet by mouth daily    diclofenac (VOLTAREN) 75 MG EC tablet Take 1 tablet by mouth twice daily    Betamethasone Dipropionate (SERNIVO) 0.05 % EMUL apply to neck as directed    Crisaborole (EUCRISA) 2 % OINT ceived the following from [facility]: Outside name: Eucrisa 2 % oint    fluticasone (FLONASE) 50 MCG/ACT nasal spray 2 sprays to each nostril daily. No current facility-administered medications for this visit.      Health Maintenance   Topic Date Due    HIV screen  Never done    Hepatitis C screen  Never done    Shingles vaccine (2 of 2) 10/22/2020    Colorectal

## 2023-12-14 ENCOUNTER — TELEPHONE (OUTPATIENT)
Dept: PRIMARY CARE CLINIC | Facility: CLINIC | Age: 63
End: 2023-12-14

## 2023-12-14 NOTE — TELEPHONE ENCOUNTER
Called and spoke with the patient- patient reports that last night she started having chills and a headache.  Today tested positive for Covid, Chills come and go, nasal congestion, minor cough.   Recommended that the patient take Emergen C packets, Tylenol and Mucinex without the Decongestant.due to being on BP meds. Patient has multiple covid shots on file.  Patient does have VV tomorrow told her that I will keep that on for now, that if nothing worsens we can cancel. Will still ask Dr Geronimo if recommendations are appropriate

## 2023-12-15 ENCOUNTER — TELEMEDICINE (OUTPATIENT)
Dept: PRIMARY CARE CLINIC | Facility: CLINIC | Age: 63
End: 2023-12-15
Payer: COMMERCIAL

## 2023-12-15 DIAGNOSIS — U07.1 COVID-19: Primary | ICD-10-CM

## 2023-12-15 DIAGNOSIS — J45.20 MILD INTERMITTENT ASTHMA WITHOUT COMPLICATION: ICD-10-CM

## 2023-12-15 PROCEDURE — 99213 OFFICE O/P EST LOW 20 MIN: CPT | Performed by: FAMILY MEDICINE

## 2023-12-15 SDOH — ECONOMIC STABILITY: FOOD INSECURITY: WITHIN THE PAST 12 MONTHS, THE FOOD YOU BOUGHT JUST DIDN'T LAST AND YOU DIDN'T HAVE MONEY TO GET MORE.: NEVER TRUE

## 2023-12-15 SDOH — ECONOMIC STABILITY: INCOME INSECURITY: HOW HARD IS IT FOR YOU TO PAY FOR THE VERY BASICS LIKE FOOD, HOUSING, MEDICAL CARE, AND HEATING?: NOT HARD AT ALL

## 2023-12-15 SDOH — ECONOMIC STABILITY: HOUSING INSECURITY
IN THE LAST 12 MONTHS, WAS THERE A TIME WHEN YOU DID NOT HAVE A STEADY PLACE TO SLEEP OR SLEPT IN A SHELTER (INCLUDING NOW)?: NO

## 2023-12-15 SDOH — ECONOMIC STABILITY: FOOD INSECURITY: WITHIN THE PAST 12 MONTHS, YOU WORRIED THAT YOUR FOOD WOULD RUN OUT BEFORE YOU GOT MONEY TO BUY MORE.: NEVER TRUE

## 2023-12-15 ASSESSMENT — PATIENT HEALTH QUESTIONNAIRE - PHQ9
SUM OF ALL RESPONSES TO PHQ QUESTIONS 1-9: 0
SUM OF ALL RESPONSES TO PHQ QUESTIONS 1-9: 0
SUM OF ALL RESPONSES TO PHQ9 QUESTIONS 1 & 2: 0
SUM OF ALL RESPONSES TO PHQ QUESTIONS 1-9: 0
SUM OF ALL RESPONSES TO PHQ QUESTIONS 1-9: 0
2. FEELING DOWN, DEPRESSED OR HOPELESS: 0
1. LITTLE INTEREST OR PLEASURE IN DOING THINGS: 0

## 2023-12-15 NOTE — PROGRESS NOTES
Shakira Feliciano, was evaluated through a synchronous (real-time) audio encounter. The patient (or guardian if applicable) is aware that this is a billable service, which includes applicable co-pays. This Virtual Visit was conducted with patient's (and/or legal guardian's) consent. Patient identification was verified, and a caregiver was present when appropriate. The [facility]  Provider was located at [facility] (Appt Dept): [address] [place] [address] [address] Amrita Feliciano (:  1960) is a Established patient, presenting virtually for evaluation of the following:    Assessment & Plan   Below is the assessment and plan developed based on review of pertinent history, physical exam, labs, studies, and medications. 1. COVID-19  -     nirmatrelvir/ritonavir 300/100 (PAXLOVID) 20 x 150 MG & 10 x 100MG TBPK; Take 3 tablets (two 150 mg nirmatrelvir and one 100 mg ritonavir tablets) by mouth every 12 hours for 5 days. , Disp-30 tablet, R-0Normal  2. Mild intermittent asthma without complication  Advised supportive care, hydration, rest.  Discussed recommendations regarding treatment. She wants to consider paxlovid. We have discussed the use of and associated risks and side effects with paxlovid use. Follow up or seek care if your condition worsens, kristin chest pain or dyspnea. Subjective   HPI       Pmhx : migraine, HTN, HLD, asthma, eczema. C/o nasal congestion and fatigue. Since Wednesday  night. Tested positive for covid19 Wednesday night. Denies fever. Denies chest pain or dyspnea. Of note, we were unable to connect via video chat due to her poor connection. Visit was conducted by phone.      --Josue Tidwell, DO

## 2024-02-01 ENCOUNTER — OFFICE VISIT (OUTPATIENT)
Dept: PRIMARY CARE CLINIC | Facility: CLINIC | Age: 64
End: 2024-02-01
Payer: COMMERCIAL

## 2024-02-01 VITALS
HEART RATE: 61 BPM | BODY MASS INDEX: 30.97 KG/M2 | RESPIRATION RATE: 16 BRPM | DIASTOLIC BLOOD PRESSURE: 81 MMHG | TEMPERATURE: 98.5 F | WEIGHT: 174.8 LBS | HEIGHT: 63 IN | SYSTOLIC BLOOD PRESSURE: 160 MMHG | OXYGEN SATURATION: 97 %

## 2024-02-01 DIAGNOSIS — R00.2 PALPITATIONS: ICD-10-CM

## 2024-02-01 DIAGNOSIS — R73.02 IGT (IMPAIRED GLUCOSE TOLERANCE): ICD-10-CM

## 2024-02-01 DIAGNOSIS — R00.2 PALPITATIONS: Primary | ICD-10-CM

## 2024-02-01 DIAGNOSIS — I10 ESSENTIAL HYPERTENSION: ICD-10-CM

## 2024-02-01 DIAGNOSIS — R07.89 OTHER CHEST PAIN: ICD-10-CM

## 2024-02-01 LAB
ERYTHROCYTE [DISTWIDTH] IN BLOOD BY AUTOMATED COUNT: 13.6 % (ref 11.5–14.5)
HCT VFR BLD AUTO: 41.3 % (ref 35–47)
HGB BLD-MCNC: 13.7 G/DL (ref 11.5–16)
MCH RBC QN AUTO: 29.3 PG (ref 26–34)
MCHC RBC AUTO-ENTMCNC: 33.2 G/DL (ref 30–36.5)
MCV RBC AUTO: 88.4 FL (ref 80–99)
NRBC # BLD: 0 K/UL (ref 0–0.01)
NRBC BLD-RTO: 0 PER 100 WBC
PLATELET # BLD AUTO: 308 K/UL (ref 150–400)
PMV BLD AUTO: 10.7 FL (ref 8.9–12.9)
RBC # BLD AUTO: 4.67 M/UL (ref 3.8–5.2)
TSH SERPL DL<=0.05 MIU/L-ACNC: 2.11 UIU/ML (ref 0.36–3.74)
WBC # BLD AUTO: 6.8 K/UL (ref 3.6–11)

## 2024-02-01 PROCEDURE — 99214 OFFICE O/P EST MOD 30 MIN: CPT | Performed by: FAMILY MEDICINE

## 2024-02-01 PROCEDURE — 93000 ELECTROCARDIOGRAM COMPLETE: CPT | Performed by: FAMILY MEDICINE

## 2024-02-01 PROCEDURE — 3078F DIAST BP <80 MM HG: CPT | Performed by: FAMILY MEDICINE

## 2024-02-01 PROCEDURE — 3077F SYST BP >= 140 MM HG: CPT | Performed by: FAMILY MEDICINE

## 2024-02-01 RX ORDER — LOSARTAN POTASSIUM AND HYDROCHLOROTHIAZIDE 25; 100 MG/1; MG/1
1 TABLET ORAL DAILY
Qty: 30 TABLET | Refills: 0 | Status: SHIPPED | OUTPATIENT
Start: 2024-02-01

## 2024-02-01 SDOH — ECONOMIC STABILITY: FOOD INSECURITY: WITHIN THE PAST 12 MONTHS, YOU WORRIED THAT YOUR FOOD WOULD RUN OUT BEFORE YOU GOT MONEY TO BUY MORE.: NEVER TRUE

## 2024-02-01 SDOH — ECONOMIC STABILITY: FOOD INSECURITY: WITHIN THE PAST 12 MONTHS, THE FOOD YOU BOUGHT JUST DIDN'T LAST AND YOU DIDN'T HAVE MONEY TO GET MORE.: NEVER TRUE

## 2024-02-01 SDOH — ECONOMIC STABILITY: INCOME INSECURITY: HOW HARD IS IT FOR YOU TO PAY FOR THE VERY BASICS LIKE FOOD, HOUSING, MEDICAL CARE, AND HEATING?: NOT HARD AT ALL

## 2024-02-01 ASSESSMENT — PATIENT HEALTH QUESTIONNAIRE - PHQ9
SUM OF ALL RESPONSES TO PHQ9 QUESTIONS 1 & 2: 2
SUM OF ALL RESPONSES TO PHQ QUESTIONS 1-9: 2
2. FEELING DOWN, DEPRESSED OR HOPELESS: 1
SUM OF ALL RESPONSES TO PHQ QUESTIONS 1-9: 2
1. LITTLE INTEREST OR PLEASURE IN DOING THINGS: 1

## 2024-02-01 NOTE — PROGRESS NOTES
Subjective  Gail Bailon is an 63 y.o. female who presents for palpitations.    Pmhx : migraine, HTN, HLD, asthma, eczema.      C/o palpitations with associated mild discomfort. These have been occurring at night when she is lying in bed. Onset in the past 2 weeks. Precipitated by emotional stress.   Caffeine intake is 0-1 servings per day.  Admits to unhealthy diet.   Works at a , job is physically active.   Denies exertional chest pain or dyspnea.   Palpitations are not exacerbated/triggered by exertion.     Increased stressors. Her mother is chronically ill and she is helping to provide care for her. She is doing this in addition to working full time.     Allergies - reviewed:   Allergies   Allergen Reactions    Penicillins      Other reaction(s): Unknown (comments)    Sulfamethoxazole-Trimethoprim Other (See Comments)         Medications - reviewed:   Current Outpatient Medications   Medication Sig    valsartan (DIOVAN) 40 MG tablet Take 1 tablet by mouth daily    albuterol sulfate HFA (PROVENTIL;VENTOLIN;PROAIR) 108 (90 Base) MCG/ACT inhaler Inhale 2 puffs into the lungs every 4 hours as needed for Wheezing    hydroCHLOROthiazide (HYDRODIURIL) 25 MG tablet Take 1 tablet by mouth daily    montelukast (SINGULAIR) 10 MG tablet Take 1 tablet by mouth daily    Betamethasone Dipropionate (SERNIVO) 0.05 % EMUL apply to neck as directed    Crisaborole (EUCRISA) 2 % OINT ceived the following from [facility]: Outside name: Eucrisa 2 % oint    fluticasone (FLONASE) 50 MCG/ACT nasal spray 2 sprays to each nostril daily.    fluticasone (FLONASE) 50 MCG/ACT nasal spray 1 spray by Each Nostril route daily (Patient not taking: Reported on 12/15/2023)    atorvastatin (LIPITOR) 20 MG tablet Take 1 tablet by mouth daily (Patient not taking: Reported on 2/1/2024)    diclofenac (VOLTAREN) 75 MG EC tablet Take 1 tablet by mouth twice daily (Patient not taking: Reported on 2/1/2024)     No current

## 2024-02-01 NOTE — PROGRESS NOTES
\"Have you been to the ER, urgent care clinic since your last visit?  Hospitalized since your last visit?\"    no    “Have you seen or consulted any other health care providers outside of [facility] since your last visit?”    no    “Have you had a colorectal cancer screening such as a colonoscopy/FIT/Cologuard?    Due in March

## 2024-02-03 LAB
EST. AVERAGE GLUCOSE BLD GHB EST-MCNC: 120 MG/DL
HBA1C MFR BLD: 5.8 % (ref 4–5.6)

## 2024-02-21 ENCOUNTER — TELEPHONE (OUTPATIENT)
Dept: PRIMARY CARE CLINIC | Facility: CLINIC | Age: 64
End: 2024-02-21

## 2024-02-21 NOTE — TELEPHONE ENCOUNTER
Pt ([phone]) advised of congestion in her nose and coughing. She is seeing blood when she blows her nose and she is coughing up blood also. She is willing to see another provider. I wasn't able to offer her anything that was acceptable. Please leave a voicemail message if she doesn't answer.     Please assist with this matter.     VLT - PSR

## 2024-03-01 RX ORDER — HYDROCHLOROTHIAZIDE 25 MG/1
25 TABLET ORAL DAILY
Qty: 90 TABLET | Refills: 0 | OUTPATIENT
Start: 2024-03-01

## 2024-03-03 DIAGNOSIS — I10 ESSENTIAL (PRIMARY) HYPERTENSION: ICD-10-CM

## 2024-03-05 RX ORDER — VALSARTAN 40 MG/1
40 TABLET ORAL DAILY
Qty: 90 TABLET | Refills: 1 | Status: SHIPPED | OUTPATIENT
Start: 2024-03-05

## 2024-03-05 RX ORDER — LOSARTAN POTASSIUM AND HYDROCHLOROTHIAZIDE 25; 100 MG/1; MG/1
1 TABLET ORAL DAILY
Qty: 30 TABLET | Refills: 0 | OUTPATIENT
Start: 2024-03-05

## 2024-03-05 RX ORDER — HYDROCHLOROTHIAZIDE 25 MG/1
25 TABLET ORAL DAILY
Qty: 90 TABLET | Refills: 1 | Status: SHIPPED | OUTPATIENT
Start: 2024-03-05

## 2024-04-23 ENCOUNTER — COMMUNITY OUTREACH (OUTPATIENT)
Dept: PRIMARY CARE CLINIC | Facility: CLINIC | Age: 64
End: 2024-04-23

## 2024-08-12 ENCOUNTER — TELEPHONE (OUTPATIENT)
Dept: PRIMARY CARE CLINIC | Facility: CLINIC | Age: 64
End: 2024-08-12

## 2024-08-12 NOTE — TELEPHONE ENCOUNTER
----- Message from Abhijeet HUFF sent at 8/12/2024 12:34 PM EDT -----  Regarding: ECC Escalation To Practice  ECC Escalation To Practice      Type of Escalation: Red Flag Symptom  --------------------------------------------------------------------------------------------------------------------------    Information for Provider:  Patient is looking for appointment for: Symptom / dizziness  Reasons for Message: Unable to reach practice     Additional Information patient having dizziness   --------------------------------------------------------------------------------------------------------------------------    Relationship to Patient: Self     Call Back Info: OK to leave message on voicemail  Preferred Call Back Number: Phone [phone] (home)

## 2024-08-15 ENCOUNTER — OFFICE VISIT (OUTPATIENT)
Dept: PRIMARY CARE CLINIC | Facility: CLINIC | Age: 64
End: 2024-08-15
Payer: COMMERCIAL

## 2024-08-15 VITALS
TEMPERATURE: 97.6 F | HEIGHT: 63 IN | OXYGEN SATURATION: 95 % | DIASTOLIC BLOOD PRESSURE: 70 MMHG | WEIGHT: 178 LBS | HEART RATE: 60 BPM | SYSTOLIC BLOOD PRESSURE: 120 MMHG | RESPIRATION RATE: 16 BRPM | BODY MASS INDEX: 31.54 KG/M2

## 2024-08-15 DIAGNOSIS — R74.8 ELEVATED ALKALINE PHOSPHATASE LEVEL: ICD-10-CM

## 2024-08-15 DIAGNOSIS — I10 ESSENTIAL HYPERTENSION: ICD-10-CM

## 2024-08-15 DIAGNOSIS — E78.2 MIXED HYPERLIPIDEMIA: ICD-10-CM

## 2024-08-15 DIAGNOSIS — I10 ESSENTIAL (PRIMARY) HYPERTENSION: ICD-10-CM

## 2024-08-15 DIAGNOSIS — R73.02 IGT (IMPAIRED GLUCOSE TOLERANCE): Primary | ICD-10-CM

## 2024-08-15 PROCEDURE — 3078F DIAST BP <80 MM HG: CPT | Performed by: FAMILY MEDICINE

## 2024-08-15 PROCEDURE — 3074F SYST BP LT 130 MM HG: CPT | Performed by: FAMILY MEDICINE

## 2024-08-15 PROCEDURE — 99214 OFFICE O/P EST MOD 30 MIN: CPT | Performed by: FAMILY MEDICINE

## 2024-08-15 RX ORDER — HYDROCHLOROTHIAZIDE 25 MG/1
25 TABLET ORAL DAILY
Qty: 90 TABLET | Refills: 1 | Status: SHIPPED | OUTPATIENT
Start: 2024-08-15

## 2024-08-15 RX ORDER — ALBUTEROL SULFATE 90 UG/1
2 AEROSOL, METERED RESPIRATORY (INHALATION) EVERY 4 HOURS PRN
Qty: 2 EACH | Refills: 3 | Status: SHIPPED | OUTPATIENT
Start: 2024-08-15

## 2024-08-15 RX ORDER — VALSARTAN 40 MG/1
40 TABLET ORAL DAILY
Qty: 90 TABLET | Refills: 1 | Status: SHIPPED | OUTPATIENT
Start: 2024-08-15

## 2024-08-15 RX ORDER — MONTELUKAST SODIUM 10 MG/1
10 TABLET ORAL DAILY
Qty: 90 TABLET | Refills: 3 | Status: SHIPPED | OUTPATIENT
Start: 2024-08-15

## 2024-08-15 ASSESSMENT — PATIENT HEALTH QUESTIONNAIRE - PHQ9
2. FEELING DOWN, DEPRESSED OR HOPELESS: NOT AT ALL
SUM OF ALL RESPONSES TO PHQ QUESTIONS 1-9: 0
SUM OF ALL RESPONSES TO PHQ9 QUESTIONS 1 & 2: 0
SUM OF ALL RESPONSES TO PHQ QUESTIONS 1-9: 0
1. LITTLE INTEREST OR PLEASURE IN DOING THINGS: NOT AT ALL

## 2024-08-15 NOTE — PROGRESS NOTES
Subjective  Gail Bailon is an 63 y.o. female who presents for follow up.     Pmhx : migraine, HTN, HLD, asthma, eczema.      C/o nose bleeds last week. Intermittent oozing left nose. This has resolved. No other signs of bleeding.     HTN. Controlled on hctz, valsartan.      HLD. Controlled without medication.    She's started exercising and tolerating this well.    Works as a cook at a .   She's been referred for cscope but has not completed this.       Allergies - reviewed:   Allergies   Allergen Reactions    Penicillins      Other reaction(s): Unknown (comments)    Sulfamethoxazole-Trimethoprim Other (See Comments)         Medications - reviewed:   Current Outpatient Medications   Medication Sig    hydroCHLOROthiazide (HYDRODIURIL) 25 MG tablet Take 1 tablet by mouth daily    valsartan (DIOVAN) 40 MG tablet Take 1 tablet by mouth daily    albuterol sulfate HFA (PROVENTIL;VENTOLIN;PROAIR) 108 (90 Base) MCG/ACT inhaler Inhale 2 puffs into the lungs every 4 hours as needed for Wheezing    montelukast (SINGULAIR) 10 MG tablet Take 1 tablet by mouth daily    Betamethasone Dipropionate (SERNIVO) 0.05 % EMUL apply to neck as directed    Crisaborole (EUCRISA) 2 % OINT ceived the following from [facility]: Outside name: Eucrisa 2 % oint    fluticasone (FLONASE) 50 MCG/ACT nasal spray 2 sprays to each nostril daily.     No current facility-administered medications for this visit.         ROS  CONSTITUTIONAL: Denies fever.  CARDIOVASCULAR: Denies chest pain, orthopnea, PND.  RESPIRATORY: Denies cough, dyspnea.  GI: Denies abdominal pain, diarrhea, constipation, black or bloody stool.         Physical Exam  /70 (Site: Left Upper Arm, Position: Sitting, Cuff Size: Medium Adult)   Pulse 60   Temp 97.6 °F (36.4 °C) (Temporal)   Resp 16   Ht 1.6 m (5' 3\")   Wt 80.7 kg (178 lb)   SpO2 95%   BMI 31.53 kg/m²   Physical Exam  Vitals reviewed.   Constitutional:       Appearance: Normal

## 2024-08-15 NOTE — PROGRESS NOTES
\"Have you been to the ER, urgent care clinic since your last visit?  Hospitalized since your last visit?\"    NO    “Have you seen or consulted any other health care providers outside of [facility] since your last visit?”    NO        “Have you had a colorectal cancer screening such as a colonoscopy/FIT/Cologuard?    YES - Type: Colonoscopy - Where: 2013 Nurse/CMA to request most recent records if not in the chart     No colonoscopy on file  No cologuard on file  Date of last FIT: 8/30/2020   No flexible sigmoidoscopy on file         Click Here for Release of Records Request

## 2025-03-04 ENCOUNTER — TELEPHONE (OUTPATIENT)
Dept: PRIMARY CARE CLINIC | Facility: CLINIC | Age: 65
End: 2025-03-04

## 2025-03-04 NOTE — TELEPHONE ENCOUNTER
----- Message from Tico MTZ sent at 3/4/2025  9:36 AM EST -----  Regarding: ECC Appointment Request  ECC Appointment Request    Patient needs appointment for ECC Appointment Type: New Patient. + Patient also needs her high blood medication to be authorize during her visit.    Patient Requested Dates(s): Friday March 14, 2025  Patient Requested Time: 11AM  Provider Name: Trini Aparicio DO    Reason for Appointment Request: New Patient - No appointments available during search  --------------------------------------------------------------------------------------------------------------------------    Relationship to Patient: Self     Call Back Information: OK to leave message on [employer]  Preferred Call Back Number: Phone [phone]

## 2025-03-06 DIAGNOSIS — I10 ESSENTIAL (PRIMARY) HYPERTENSION: ICD-10-CM

## 2025-03-06 RX ORDER — HYDROCHLOROTHIAZIDE 25 MG/1
25 TABLET ORAL DAILY
Qty: 15 TABLET | Refills: 0 | Status: SHIPPED | OUTPATIENT
Start: 2025-03-06

## 2025-03-06 RX ORDER — VALSARTAN 40 MG/1
40 TABLET ORAL DAILY
Qty: 15 TABLET | Refills: 0 | Status: SHIPPED | OUTPATIENT
Start: 2025-03-06

## 2025-03-06 NOTE — TELEPHONE ENCOUNTER
Patient is seeing Jonah on march 20th for New to Provider visit but is out of her Valsartan and Hydrochlorothiazide. Can she get a couple of weeks worth of medication to get her to her appointment?  She uses [facility] on [facility].

## 2025-03-06 NOTE — TELEPHONE ENCOUNTER
PCP: No primary care provider on file.    Last Visit Visit date not found   Future Appointments   Date Time Provider Department Center   3/20/2025  1:00 PM [facility]       Requested Prescriptions     Pending Prescriptions Disp Refills    hydroCHLOROthiazide (HYDRODIURIL) 25 MG tablet 90 tablet 1     Sig: Take 1 tablet by mouth daily    valsartan (DIOVAN) 40 MG tablet 90 tablet 1     Sig: Take 1 tablet by mouth daily         Other Comments: Last Refill

## 2025-03-20 ENCOUNTER — OFFICE VISIT (OUTPATIENT)
Dept: PRIMARY CARE CLINIC | Facility: CLINIC | Age: 65
End: 2025-03-20
Payer: COMMERCIAL

## 2025-03-20 VITALS
RESPIRATION RATE: 14 BRPM | HEIGHT: 63 IN | HEART RATE: 50 BPM | TEMPERATURE: 97.5 F | SYSTOLIC BLOOD PRESSURE: 162 MMHG | WEIGHT: 171 LBS | DIASTOLIC BLOOD PRESSURE: 77 MMHG | OXYGEN SATURATION: 98 % | BODY MASS INDEX: 30.3 KG/M2

## 2025-03-20 DIAGNOSIS — J30.2 SEASONAL ALLERGIES: ICD-10-CM

## 2025-03-20 DIAGNOSIS — Z11.59 NEED FOR HEPATITIS C SCREENING TEST: ICD-10-CM

## 2025-03-20 DIAGNOSIS — Z76.89 ENCOUNTER TO ESTABLISH CARE: ICD-10-CM

## 2025-03-20 DIAGNOSIS — I10 ESSENTIAL HYPERTENSION: Primary | ICD-10-CM

## 2025-03-20 DIAGNOSIS — E78.2 MIXED HYPERLIPIDEMIA: ICD-10-CM

## 2025-03-20 DIAGNOSIS — Z12.11 COLON CANCER SCREENING: ICD-10-CM

## 2025-03-20 DIAGNOSIS — E55.9 HYPOVITAMINOSIS D: ICD-10-CM

## 2025-03-20 LAB
25(OH)D3 SERPL-MCNC: 42.6 NG/ML (ref 30–100)
ALBUMIN SERPL-MCNC: 3.8 G/DL (ref 3.5–5)
ALBUMIN/GLOB SERPL: 1 (ref 1.1–2.2)
ALP SERPL-CCNC: 118 U/L (ref 45–117)
ALT SERPL-CCNC: 22 U/L (ref 12–78)
ANION GAP SERPL CALC-SCNC: 6 MMOL/L (ref 2–12)
AST SERPL-CCNC: 24 U/L (ref 15–37)
BASOPHILS # BLD: 0.03 K/UL (ref 0–0.1)
BASOPHILS NFR BLD: 0.4 % (ref 0–1)
BILIRUB SERPL-MCNC: 0.7 MG/DL (ref 0.2–1)
BUN SERPL-MCNC: 15 MG/DL (ref 6–20)
BUN/CREAT SERPL: 25 (ref 12–20)
CALCIUM SERPL-MCNC: 10 MG/DL (ref 8.5–10.1)
CHLORIDE SERPL-SCNC: 104 MMOL/L (ref 97–108)
CHOLEST SERPL-MCNC: 213 MG/DL
CO2 SERPL-SCNC: 30 MMOL/L (ref 21–32)
CREAT SERPL-MCNC: 0.6 MG/DL (ref 0.55–1.02)
DIFFERENTIAL METHOD BLD: NORMAL
EOSINOPHIL # BLD: 0.11 K/UL (ref 0–0.4)
EOSINOPHIL NFR BLD: 1.6 % (ref 0–7)
ERYTHROCYTE [DISTWIDTH] IN BLOOD BY AUTOMATED COUNT: 13.8 % (ref 11.5–14.5)
GLOBULIN SER CALC-MCNC: 4 G/DL (ref 2–4)
GLUCOSE SERPL-MCNC: 91 MG/DL (ref 65–100)
HCT VFR BLD AUTO: 45.3 % (ref 35–47)
HCV AB SER IA-ACNC: 0.14 INDEX
HCV AB SERPL QL IA: NONREACTIVE
HDLC SERPL-MCNC: 55 MG/DL
HDLC SERPL: 3.9 (ref 0–5)
HGB BLD-MCNC: 14.6 G/DL (ref 11.5–16)
IMM GRANULOCYTES # BLD AUTO: 0.01 K/UL (ref 0–0.04)
IMM GRANULOCYTES NFR BLD AUTO: 0.1 % (ref 0–0.5)
LDLC SERPL CALC-MCNC: 141.6 MG/DL (ref 0–100)
LYMPHOCYTES # BLD: 2.25 K/UL (ref 0.8–3.5)
LYMPHOCYTES NFR BLD: 31.8 % (ref 12–49)
MCH RBC QN AUTO: 28.5 PG (ref 26–34)
MCHC RBC AUTO-ENTMCNC: 32.2 G/DL (ref 30–36.5)
MCV RBC AUTO: 88.5 FL (ref 80–99)
MONOCYTES # BLD: 0.39 K/UL (ref 0–1)
MONOCYTES NFR BLD: 5.5 % (ref 5–13)
NEUTS SEG # BLD: 4.29 K/UL (ref 1.8–8)
NEUTS SEG NFR BLD: 60.6 % (ref 32–75)
NRBC # BLD: 0 K/UL (ref 0–0.01)
NRBC BLD-RTO: 0 PER 100 WBC
PLATELET # BLD AUTO: 311 K/UL (ref 150–400)
PMV BLD AUTO: 11.3 FL (ref 8.9–12.9)
POTASSIUM SERPL-SCNC: 3.7 MMOL/L (ref 3.5–5.1)
PROT SERPL-MCNC: 7.8 G/DL (ref 6.4–8.2)
RBC # BLD AUTO: 5.12 M/UL (ref 3.8–5.2)
SODIUM SERPL-SCNC: 140 MMOL/L (ref 136–145)
TRIGL SERPL-MCNC: 82 MG/DL
TSH SERPL DL<=0.05 MIU/L-ACNC: 1.97 UIU/ML (ref 0.36–3.74)
VLDLC SERPL CALC-MCNC: 16.4 MG/DL
WBC # BLD AUTO: 7.1 K/UL (ref 3.6–11)

## 2025-03-20 PROCEDURE — 3077F SYST BP >= 140 MM HG: CPT | Performed by: NURSE PRACTITIONER

## 2025-03-20 PROCEDURE — 3078F DIAST BP <80 MM HG: CPT | Performed by: NURSE PRACTITIONER

## 2025-03-20 PROCEDURE — 99214 OFFICE O/P EST MOD 30 MIN: CPT | Performed by: NURSE PRACTITIONER

## 2025-03-20 RX ORDER — MONTELUKAST SODIUM 10 MG/1
10 TABLET ORAL DAILY
Qty: 90 TABLET | Refills: 3 | Status: SHIPPED | OUTPATIENT
Start: 2025-03-20

## 2025-03-20 RX ORDER — ATORVASTATIN CALCIUM 20 MG/1
20 TABLET, FILM COATED ORAL DAILY
Qty: 30 TABLET | Refills: 0 | Status: SHIPPED | OUTPATIENT
Start: 2025-03-20

## 2025-03-20 RX ORDER — LOSARTAN POTASSIUM AND HYDROCHLOROTHIAZIDE 25; 100 MG/1; MG/1
1 TABLET ORAL DAILY
Qty: 90 TABLET | Refills: 1 | Status: SHIPPED | OUTPATIENT
Start: 2025-03-20

## 2025-03-20 RX ORDER — FLUTICASONE PROPIONATE 50 MCG
1 SPRAY, SUSPENSION (ML) NASAL DAILY PRN
Qty: 16 G | Refills: 0 | Status: SHIPPED | OUTPATIENT
Start: 2025-03-20

## 2025-03-20 SDOH — ECONOMIC STABILITY: FOOD INSECURITY: WITHIN THE PAST 12 MONTHS, YOU WORRIED THAT YOUR FOOD WOULD RUN OUT BEFORE YOU GOT MONEY TO BUY MORE.: NEVER TRUE

## 2025-03-20 SDOH — ECONOMIC STABILITY: FOOD INSECURITY: WITHIN THE PAST 12 MONTHS, THE FOOD YOU BOUGHT JUST DIDN'T LAST AND YOU DIDN'T HAVE MONEY TO GET MORE.: NEVER TRUE

## 2025-03-20 ASSESSMENT — PATIENT HEALTH QUESTIONNAIRE - PHQ9
SUM OF ALL RESPONSES TO PHQ QUESTIONS 1-9: 0
2. FEELING DOWN, DEPRESSED OR HOPELESS: NOT AT ALL
1. LITTLE INTEREST OR PLEASURE IN DOING THINGS: NOT AT ALL

## 2025-03-20 ASSESSMENT — ENCOUNTER SYMPTOMS
ABDOMINAL PAIN: 0
CONSTIPATION: 0
CHEST TIGHTNESS: 0
DIARRHEA: 0
BACK PAIN: 0
COUGH: 0
EYE DISCHARGE: 0
RHINORRHEA: 0
COLOR CHANGE: 0
SORE THROAT: 0
SHORTNESS OF BREATH: 0

## 2025-03-20 NOTE — PROGRESS NOTES
\"Have you been to the ER, urgent care clinic since your last visit?  Hospitalized since your last visit?\"    NO    “Have you seen or consulted any other health care providers outside our system since your last visit?”    NO      “Have you had a colorectal cancer screening such as a colonoscopy/FIT/Cologuard?    NO    No colonoscopy on file  No cologuard on file  Date of last FIT: 8/30/2020   No flexible sigmoidoscopy on file          
(CERVIX REMOVED)  2005    for fibroids       No visits with results within 3 Month(s) from this visit.   Latest known visit with results is:   Ancillary Procedure on 03/01/2024   Component Date Value Ref Range Status    Body Surface Area 03/01/2024 1.87  m2 Final      REVIEW OF SYSTEMS   Review of Systems   Constitutional:  Negative for activity change, fatigue and unexpected weight change.   HENT:  Negative for congestion, hearing loss, rhinorrhea and sore throat.    Eyes:  Negative for discharge.   Respiratory:  Negative for cough, chest tightness and shortness of breath.    Gastrointestinal:  Negative for abdominal pain, constipation and diarrhea.   Genitourinary:  Negative for dysuria, flank pain, frequency and urgency.   Musculoskeletal:  Negative for arthralgias, back pain and myalgias.   Skin:  Negative for color change and rash.   Neurological:  Negative for dizziness, light-headedness and headaches.   Psychiatric/Behavioral:  Negative for dysphoric mood and sleep disturbance. The patient is not nervous/anxious.      PHYSICAL EXAM   BP (!) 162/77 (BP Site: Left Upper Arm, Patient Position: Sitting, BP Cuff Size: Medium Adult)   Pulse 50   Temp 97.5 °F (36.4 °C) (Temporal)   Resp 14   Ht 1.6 m (5' 3\")   Wt 77.6 kg (171 lb)   SpO2 98%   BMI 30.29 kg/m²      Physical Exam  Vitals and nursing note reviewed.   Constitutional:       General: She is not in acute distress.     Appearance: Normal appearance. She is normal weight. She is not diaphoretic.   HENT:      Right Ear: Tympanic membrane, ear canal and external ear normal.      Left Ear: Tympanic membrane, ear canal and external ear normal.      Mouth/Throat:      Mouth: Mucous membranes are moist.      Pharynx: Oropharynx is clear.   Eyes:      General:         Right eye: No discharge.         Left eye: No discharge.      Extraocular Movements: Extraocular movements intact.      Conjunctiva/sclera: Conjunctivae normal.   Cardiovascular:      Rate and

## 2025-03-20 NOTE — ASSESSMENT & PLAN NOTE
Patient with primary hypertension, currently uncontrolled.  The patient was switched from valsartan 40 and HCTZ 25 monotherapy to combination of losartan hydrochlorothiazide.  The patient has requested combination therapy.  Patient encouraged to continue monitoring blood pressure readings at home and to schedule follow-up in 1 month for blood pressure check.  Rx sent to pharmacy.    Orders:    losartan-hydroCHLOROthiazide (HYZAAR) 100-25 MG per tablet; Take 1 tablet by mouth daily

## 2025-03-21 ENCOUNTER — TELEPHONE (OUTPATIENT)
Age: 65
End: 2025-03-21

## 2025-03-24 ENCOUNTER — RESULTS FOLLOW-UP (OUTPATIENT)
Dept: PRIMARY CARE CLINIC | Facility: CLINIC | Age: 65
End: 2025-03-24

## 2025-03-25 ENCOUNTER — PREP FOR PROCEDURE (OUTPATIENT)
Age: 65
End: 2025-03-25

## 2025-03-25 DIAGNOSIS — Z12.11 SCREENING FOR COLON CANCER: ICD-10-CM

## 2025-03-25 NOTE — TELEPHONE ENCOUNTER
Contacted patient back to schedule colonoscopy. Patient requested sometime in July. Offered patient 7/18 and patient accepted. Notified her of arrival time and stated that I will send a surgical letter and prep information to [facility] and home address. Patient thanked me.

## 2025-03-25 NOTE — TELEPHONE ENCOUNTER
----- Message from GREYSON Nation sent at 3/24/2025  9:04 AM EDT -----  Gail,  As discussed during our meeting your cholesterol has been consistently elevated. I can see that you have made changes but I believe you are in need of cholesterol lowering medications. I encourage you to continue your diet which has lowered your levels a bit.  In addition please cut back on red meats (beef, pork, lamb, venison, etc), fried foods, and animal fats (butter, cheese, etc).  It is recommended a low cholesterol, low sodium diet, and exercise (at least 150 min per week) and maintain a heart healthy diet. Specifically, increased intake of vegetables/fruits and lean protein, and limiting intake of sugars and animal fats.      The rest of your resulted labs are within normal range. I look forward to seeing you in our next visit.

## 2025-03-26 RX ORDER — SODIUM CHLORIDE 9 MG/ML
INJECTION, SOLUTION INTRAVENOUS CONTINUOUS
Status: CANCELLED | OUTPATIENT
Start: 2025-03-26

## 2025-03-26 RX ORDER — SODIUM CHLORIDE 0.9 % (FLUSH) 0.9 %
5-40 SYRINGE (ML) INJECTION EVERY 12 HOURS SCHEDULED
Status: CANCELLED | OUTPATIENT
Start: 2025-03-26

## 2025-03-26 RX ORDER — SODIUM CHLORIDE 9 MG/ML
INJECTION, SOLUTION INTRAVENOUS PRN
Status: CANCELLED | OUTPATIENT
Start: 2025-03-26

## 2025-03-26 RX ORDER — SODIUM CHLORIDE 0.9 % (FLUSH) 0.9 %
5-40 SYRINGE (ML) INJECTION PRN
Status: CANCELLED | OUTPATIENT
Start: 2025-03-26

## 2025-04-02 ENCOUNTER — OFFICE VISIT (OUTPATIENT)
Dept: PRIMARY CARE CLINIC | Facility: CLINIC | Age: 65
End: 2025-04-02
Payer: COMMERCIAL

## 2025-04-02 VITALS
DIASTOLIC BLOOD PRESSURE: 75 MMHG | WEIGHT: 173 LBS | BODY MASS INDEX: 30.65 KG/M2 | SYSTOLIC BLOOD PRESSURE: 133 MMHG | HEART RATE: 78 BPM | TEMPERATURE: 98.6 F | OXYGEN SATURATION: 98 % | RESPIRATION RATE: 14 BRPM

## 2025-04-02 DIAGNOSIS — G44.83 COUGH HEADACHE: ICD-10-CM

## 2025-04-02 DIAGNOSIS — R68.89 FLU-LIKE SYMPTOMS: Primary | ICD-10-CM

## 2025-04-02 LAB
LOT EXPIRE DATE: NORMAL
LOT KIT NUMBER: 375
QUICKVUE INFLUENZA TEST: POSITIVE
SARS-COV-2, POC: NORMAL
VALID INTERNAL CONTROL, POC: YES
VALID INTERNAL CONTROL: YES
VENDOR AND KIT NAME POC: NORMAL

## 2025-04-02 PROCEDURE — 87426 SARSCOV CORONAVIRUS AG IA: CPT | Performed by: NURSE PRACTITIONER

## 2025-04-02 PROCEDURE — 99213 OFFICE O/P EST LOW 20 MIN: CPT | Performed by: NURSE PRACTITIONER

## 2025-04-02 PROCEDURE — 87804 INFLUENZA ASSAY W/OPTIC: CPT | Performed by: NURSE PRACTITIONER

## 2025-04-02 PROCEDURE — 3075F SYST BP GE 130 - 139MM HG: CPT | Performed by: NURSE PRACTITIONER

## 2025-04-02 PROCEDURE — 3078F DIAST BP <80 MM HG: CPT | Performed by: NURSE PRACTITIONER

## 2025-04-02 RX ORDER — BENZONATATE 100 MG/1
100 CAPSULE ORAL 3 TIMES DAILY PRN
Qty: 30 CAPSULE | Refills: 0 | Status: SHIPPED | OUTPATIENT
Start: 2025-04-02 | End: 2025-04-12

## 2025-04-02 RX ORDER — OSELTAMIVIR PHOSPHATE 75 MG/1
75 CAPSULE ORAL DAILY
Qty: 7 CAPSULE | Refills: 0 | Status: SHIPPED | OUTPATIENT
Start: 2025-04-02 | End: 2025-04-09

## 2025-04-02 ASSESSMENT — ENCOUNTER SYMPTOMS
COUGH: 1
RHINORRHEA: 1
SHORTNESS OF BREATH: 1

## 2025-04-02 NOTE — PROGRESS NOTES
[facility]   [address], [address]  [place] [address]  P: [phone]  F: [phone]    SUBJECTIVE     HPI:     Gail Bailon is a 64 y.o. female who is seen in the clinic for an acute visit.        The patient presents to the office today c/o of flu-like symptoms that started yesterday; chills, coughing, headache, and runny nose.  Patient does report having a flu shot this years. Patient denies any fever. Patient denies being around anyone that has been sick. Patient requesting something for her cough.         Patient Active Problem List   Diagnosis    Obesity (BMI 30.0-34.9)    Abnormal CT lung screening    Multiple pulmonary nodules    Essential hypertension    Migraine    Perennial allergic rhinitis    Mild intermittent asthma without complication    Screening for colon cancer        Past Medical History:   Diagnosis Date    Asthma     Calculus of kidney     Hematuria 07/22/2021    Migraines     Multiple pulmonary nodules 2018    Dr. Jin pulmonologist    Other and unspecified hyperlipidemia 05/2009    high CRP    Unspecified essential hypertension      Current Outpatient Medications   Medication Sig Dispense Refill    benzonatate (TESSALON) 100 MG capsule Take 1 capsule by mouth 3 times daily as needed for Cough 30 capsule 0    oseltamivir (TAMIFLU) 75 MG capsule Take 1 capsule by mouth daily for 7 days 7 capsule 0    fluticasone (FLONASE) 50 MCG/ACT nasal spray 1 spray by Nasal route daily as needed for Rhinitis 16 g 0    losartan-hydroCHLOROthiazide (HYZAAR) 100-25 MG per tablet Take 1 tablet by mouth daily 90 tablet 1    atorvastatin (LIPITOR) 20 MG tablet Take 1 tablet by mouth daily 30 tablet 0    albuterol sulfate HFA (PROVENTIL;VENTOLIN;PROAIR) 108 (90 Base) MCG/ACT inhaler Inhale 2 puffs into the lungs every 4 hours as needed for Wheezing 2 each 3    Betamethasone Dipropionate (SERNIVO) 0.05 % EMUL apply to neck as directed      Crisaborole (EUCRISA) 2 % OINT ceived the

## 2025-04-16 DIAGNOSIS — E78.2 MIXED HYPERLIPIDEMIA: ICD-10-CM

## 2025-04-16 DIAGNOSIS — J30.2 SEASONAL ALLERGIES: ICD-10-CM

## 2025-04-17 DIAGNOSIS — E78.2 MIXED HYPERLIPIDEMIA: ICD-10-CM

## 2025-04-17 DIAGNOSIS — J30.2 SEASONAL ALLERGIES: ICD-10-CM

## 2025-04-17 RX ORDER — ATORVASTATIN CALCIUM 20 MG/1
20 TABLET, FILM COATED ORAL DAILY
Qty: 30 TABLET | Refills: 0 | OUTPATIENT
Start: 2025-04-17

## 2025-04-17 RX ORDER — FLUTICASONE PROPIONATE 50 MCG
1 SPRAY, SUSPENSION (ML) NASAL DAILY PRN
Qty: 16 G | Refills: 0 | OUTPATIENT
Start: 2025-04-17

## 2025-04-17 RX ORDER — FLUTICASONE PROPIONATE 50 MCG
SPRAY, SUSPENSION (ML) NASAL
Qty: 16 ML | Refills: 1 | Status: SHIPPED | OUTPATIENT
Start: 2025-04-17

## 2025-04-17 RX ORDER — ATORVASTATIN CALCIUM 20 MG/1
20 TABLET, FILM COATED ORAL DAILY
Qty: 90 TABLET | Refills: 1 | Status: SHIPPED | OUTPATIENT
Start: 2025-04-17

## 2025-04-24 PROBLEM — Z12.11 SCREENING FOR COLON CANCER: Status: RESOLVED | Noted: 2025-03-25 | Resolved: 2025-04-24

## 2025-04-24 NOTE — PROGRESS NOTES
[facility]   [address], [address]  [place] [address]  P: [phone]  F: [phone]    SUBJECTIVE   HPI:    Ms. Gail Bailon is a 64 y.o. female who presents for a 1 month blood pressure evaluation follow up visit.  The patient is also complaining of new onset migraines which she has been experiencing on and off for two days.   She has taken Excedrin but it has not worked.  She does endorse slight vision changes when they occur.  She was treated earlier this month 4/2/2025 with Tamiflu for positive influenza.  She reports feeling better but is still experiencing some congestion, cough and postnasal drip.      PMH:   Past Medical History:   Diagnosis Date    Asthma     Calculus of kidney     Hematuria 07/22/2021    Migraines     Multiple pulmonary nodules 2018    Dr. Jin pulmonologist    Other and unspecified hyperlipidemia 05/2009    high CRP    Unspecified essential hypertension      PSH:  has a past surgical history that includes Colonoscopy (2013) and Total abdominal hysterectomy w/ bilateral salpingoophorectomy (2005).  All: is allergic to penicillins and sulfamethoxazole-trimethoprim.   MEDS:   Current Outpatient Medications   Medication Sig    butalbital-acetaminophen-caffeine (FIORICET, ESGIC) -40 MG per tablet Take 1 tablet by mouth every 4 hours as needed for Headaches    atorvastatin (LIPITOR) 20 MG tablet Take 1 tablet by mouth daily    fluticasone (FLONASE) 50 MCG/ACT nasal spray SPRAY 1 SPRAY INTRANASALLY EVERY DAY AS NEEDED FOR RHINITIS    montelukast (SINGULAIR) 10 MG tablet Take 1 tablet by mouth daily    losartan-hydroCHLOROthiazide (HYZAAR) 100-25 MG per tablet Take 1 tablet by mouth daily    albuterol sulfate HFA (PROVENTIL;VENTOLIN;PROAIR) 108 (90 Base) MCG/ACT inhaler Inhale 2 puffs into the lungs every 4 hours as needed for Wheezing    Betamethasone Dipropionate (SERNIVO) 0.05 % EMUL apply to neck as directed     No current facility-administered

## 2025-04-25 ENCOUNTER — OFFICE VISIT (OUTPATIENT)
Dept: PRIMARY CARE CLINIC | Facility: CLINIC | Age: 65
End: 2025-04-25
Payer: COMMERCIAL

## 2025-04-25 VITALS
BODY MASS INDEX: 30.44 KG/M2 | HEIGHT: 63 IN | OXYGEN SATURATION: 96 % | TEMPERATURE: 97.8 F | DIASTOLIC BLOOD PRESSURE: 84 MMHG | WEIGHT: 171.8 LBS | SYSTOLIC BLOOD PRESSURE: 130 MMHG | HEART RATE: 79 BPM | RESPIRATION RATE: 14 BRPM

## 2025-04-25 DIAGNOSIS — J30.89 PERENNIAL ALLERGIC RHINITIS: ICD-10-CM

## 2025-04-25 DIAGNOSIS — I10 ESSENTIAL HYPERTENSION: Primary | ICD-10-CM

## 2025-04-25 DIAGNOSIS — G43.909 ACUTE MIGRAINE: ICD-10-CM

## 2025-04-25 DIAGNOSIS — J39.8 CONGESTION OF UPPER RESPIRATORY TRACT: ICD-10-CM

## 2025-04-25 LAB
EST. AVERAGE GLUCOSE BLD GHB EST-MCNC: 126 MG/DL
HBA1C MFR BLD: 6 % (ref 4–5.6)

## 2025-04-25 PROCEDURE — 99213 OFFICE O/P EST LOW 20 MIN: CPT | Performed by: NURSE PRACTITIONER

## 2025-04-25 PROCEDURE — 3075F SYST BP GE 130 - 139MM HG: CPT | Performed by: NURSE PRACTITIONER

## 2025-04-25 PROCEDURE — 3079F DIAST BP 80-89 MM HG: CPT | Performed by: NURSE PRACTITIONER

## 2025-04-25 RX ORDER — BUTALBITAL, ACETAMINOPHEN AND CAFFEINE 50; 325; 40 MG/1; MG/1; MG/1
1 TABLET ORAL EVERY 4 HOURS PRN
Qty: 9 TABLET | Refills: 0 | Status: SHIPPED | OUTPATIENT
Start: 2025-04-25

## 2025-04-25 NOTE — ASSESSMENT & PLAN NOTE
The patient's blood pressure in today's visit is 130/84 indicating the changes to prescription therapy has been successful and her HTN is managed. I recommend continue current treatment plan.

## 2025-04-25 NOTE — ASSESSMENT & PLAN NOTE
Due to negative amb covid and influenza I believe the patient is experiencing allergic congestive symptoms related to the season. I have recommended the patient take her prescribed montelukast and Flonase. If symptoms persist return to office for follow-up

## 2025-04-25 NOTE — PROGRESS NOTES
Have you been to the ER, urgent care clinic since your last visit?  Hospitalized since your last visit?   YES - When: approximately 6 days ago.  Where and Why: Sick visit.    Have you seen or consulted any other health care providers outside our system since your last visit?   YES - When: approximately 6 days ago.  [facility]      “Have you had a colorectal cancer screening such as a colonoscopy/FIT/Cologuard?    YES - Type: Cologuard 2020    No colonoscopy on file  No cologuard on file  Date of last FIT: 8/30/2020   No flexible sigmoidoscopy on file

## 2025-04-28 ENCOUNTER — RESULTS FOLLOW-UP (OUTPATIENT)
Dept: PRIMARY CARE CLINIC | Facility: CLINIC | Age: 65
End: 2025-04-28

## 2025-06-15 DIAGNOSIS — J30.2 SEASONAL ALLERGIES: ICD-10-CM

## 2025-06-16 DIAGNOSIS — J30.2 SEASONAL ALLERGIES: ICD-10-CM

## 2025-06-17 DIAGNOSIS — J30.2 SEASONAL ALLERGIES: ICD-10-CM

## 2025-06-17 RX ORDER — MONTELUKAST SODIUM 10 MG/1
10 TABLET ORAL DAILY
Qty: 90 TABLET | Refills: 3 | Status: SHIPPED | OUTPATIENT
Start: 2025-06-17

## 2025-06-17 RX ORDER — FLUTICASONE PROPIONATE 50 MCG
2 SPRAY, SUSPENSION (ML) NASAL DAILY
Qty: 16 ML | Refills: 2 | Status: SHIPPED | OUTPATIENT
Start: 2025-06-17

## 2025-06-18 RX ORDER — FLUTICASONE PROPIONATE 50 MCG
SPRAY, SUSPENSION (ML) NASAL
Qty: 16 ML | Refills: 1 | OUTPATIENT
Start: 2025-06-18

## 2025-06-18 RX ORDER — MONTELUKAST SODIUM 10 MG/1
10 TABLET ORAL DAILY
Qty: 90 TABLET | Refills: 3 | OUTPATIENT
Start: 2025-06-18

## 2025-07-09 ENCOUNTER — TELEPHONE (OUTPATIENT)
Age: 65
End: 2025-07-09

## 2025-07-09 NOTE — TELEPHONE ENCOUNTER
----- Message from Tessa JACKSON sent at 3/25/2025 11:38 AM EDT -----  Regarding: Colonoscopy  Patient scheduled for colonoscopy on 7/18.  Will need prep/medication called in.

## 2025-07-10 RX ORDER — SODIUM, POTASSIUM,MAG SULFATES 17.5-3.13G
1 SOLUTION, RECONSTITUTED, ORAL ORAL SEE ADMIN INSTRUCTIONS
Qty: 1 KIT | Refills: 0 | Status: SHIPPED | OUTPATIENT
Start: 2025-07-10

## 2025-07-11 ENCOUNTER — TELEPHONE (OUTPATIENT)
Age: 65
End: 2025-07-11

## 2025-07-11 NOTE — TELEPHONE ENCOUNTER
Returned call to patient, two patient identifiers used for patient verification, name and .    Patient advised foods she can eat, medications to take and what to hold.  Patient voiced thanks and appreciation.

## 2025-07-17 ENCOUNTER — ANESTHESIA EVENT (OUTPATIENT)
Facility: HOSPITAL | Age: 65
End: 2025-07-17
Payer: COMMERCIAL

## 2025-07-17 PROBLEM — Z12.11 SCREENING FOR COLON CANCER: Status: ACTIVE | Noted: 2025-03-25

## 2025-07-17 NOTE — ANESTHESIA PRE PROCEDURE
Department of Anesthesiology  Preprocedure Note       Name:  Gail Bailon   Age:  64 y.o.  :  1960                                          MRN:  [id number]         Date:  2025      Surgeon: Surgeon(s):  Kamala Mcnamara Jr., MD    Procedure: Procedure(s):  COLONOSCOPY    Medications prior to admission:   Prior to Admission medications    Medication Sig Start Date End Date Taking? Authorizing Provider   sodium-potassium-mag sulfate (SUPREP) 17.5-3.13-1.6 GM/177ML SOLN solution Take 354 mLs by mouth See Admin Instructions 7/10/25   Kamala Mcnamara Jr., MD   fluticasone (FLONASE) 50 MCG/ACT nasal spray 2 sprays by Nasal route daily 25   Jonah Garcia FNP   montelukast (SINGULAIR) 10 MG tablet Take 1 tablet by mouth daily 25   Jonah Garcia FNP   butalbital-acetaminophen-caffeine (FIORICET, ESGIC) -40 MG per tablet Take 1 tablet by mouth every 4 hours as needed for Headaches 25   Jonah Garcia FNP   atorvastatin (LIPITOR) 20 MG tablet Take 1 tablet by mouth daily 25   Jonah Garcia FNP   losartan-hydroCHLOROthiazide (HYZAAR) 100-25 MG per tablet Take 1 tablet by mouth daily 3/20/25   Jonah Garcia FNP   albuterol sulfate HFA (PROVENTIL;VENTOLIN;PROAIR) 108 (90 Base) MCG/ACT inhaler Inhale 2 puffs into the lungs every 4 hours as needed for Wheezing 8/15/24   Hilary Geronimo DO   Betamethasone Dipropionate (SERNIVO) 0.05 % EMUL apply to neck as directed 22   Automatic Reconciliation, Ar       Current medications:    No current facility-administered medications for this encounter.     Current Outpatient Medications   Medication Sig Dispense Refill    sodium-potassium-mag sulfate (SUPREP) 17.5-3.13-1.6 GM/177ML SOLN solution Take 354 mLs by mouth See Admin Instructions 1 kit 0    fluticasone (FLONASE) 50 MCG/ACT nasal spray 2 sprays by Nasal route daily 16 mL 2    montelukast (SINGULAIR) 10 MG tablet Take 1 tablet by mouth daily 90 tablet 3

## 2025-07-18 ENCOUNTER — ANESTHESIA (OUTPATIENT)
Facility: HOSPITAL | Age: 65
End: 2025-07-18
Payer: COMMERCIAL

## 2025-07-18 ENCOUNTER — HOSPITAL ENCOUNTER (OUTPATIENT)
Facility: HOSPITAL | Age: 65
Setting detail: OUTPATIENT SURGERY
Discharge: HOME | End: 2025-07-18
Attending: SURGERY | Admitting: SURGERY
Payer: COMMERCIAL

## 2025-07-18 VITALS
HEART RATE: 44 BPM | BODY MASS INDEX: 31.15 KG/M2 | WEIGHT: 165 LBS | OXYGEN SATURATION: 99 % | DIASTOLIC BLOOD PRESSURE: 70 MMHG | TEMPERATURE: 98 F | RESPIRATION RATE: 13 BRPM | HEIGHT: 61 IN | SYSTOLIC BLOOD PRESSURE: 136 MMHG

## 2025-07-18 PROCEDURE — 3600000012 HC SURGERY LEVEL 2 ADDTL 15MIN: Performed by: SURGERY

## 2025-07-18 PROCEDURE — 7100000001 HC PACU RECOVERY - ADDTL 15 MIN: Performed by: SURGERY

## 2025-07-18 PROCEDURE — 2580000003 HC RX 258: Performed by: NURSE ANESTHETIST, CERTIFIED REGISTERED

## 2025-07-18 PROCEDURE — 45378 DIAGNOSTIC COLONOSCOPY: CPT | Performed by: SURGERY

## 2025-07-18 PROCEDURE — 3700000001 HC ADD 15 MINUTES (ANESTHESIA): Performed by: SURGERY

## 2025-07-18 PROCEDURE — 6360000002 HC RX W HCPCS: Performed by: NURSE ANESTHETIST, CERTIFIED REGISTERED

## 2025-07-18 PROCEDURE — 3700000000 HC ANESTHESIA ATTENDED CARE: Performed by: SURGERY

## 2025-07-18 PROCEDURE — 2709999900 HC NON-CHARGEABLE SUPPLY: Performed by: SURGERY

## 2025-07-18 PROCEDURE — 3600000002 HC SURGERY LEVEL 2 BASE: Performed by: SURGERY

## 2025-07-18 PROCEDURE — 7100000000 HC PACU RECOVERY - FIRST 15 MIN: Performed by: SURGERY

## 2025-07-18 RX ORDER — PROPOFOL 10 MG/ML
INJECTION, EMULSION INTRAVENOUS
Status: DISCONTINUED | OUTPATIENT
Start: 2025-07-18 | End: 2025-07-18 | Stop reason: SDUPTHER

## 2025-07-18 RX ORDER — SODIUM CHLORIDE, SODIUM LACTATE, POTASSIUM CHLORIDE, CALCIUM CHLORIDE 600; 310; 30; 20 MG/100ML; MG/100ML; MG/100ML; MG/100ML
INJECTION, SOLUTION INTRAVENOUS
Status: DISCONTINUED | OUTPATIENT
Start: 2025-07-18 | End: 2025-07-18 | Stop reason: SDUPTHER

## 2025-07-18 RX ORDER — LIDOCAINE HYDROCHLORIDE 20 MG/ML
INJECTION, SOLUTION EPIDURAL; INFILTRATION; INTRACAUDAL; PERINEURAL
Status: DISCONTINUED | OUTPATIENT
Start: 2025-07-18 | End: 2025-07-18 | Stop reason: SDUPTHER

## 2025-07-18 RX ADMIN — LIDOCAINE HYDROCHLORIDE 50 MG: 20 INJECTION, SOLUTION EPIDURAL; INFILTRATION; INTRACAUDAL; PERINEURAL at 07:35

## 2025-07-18 RX ADMIN — PROPOFOL 100 MG: 10 INJECTION, EMULSION INTRAVENOUS at 07:35

## 2025-07-18 RX ADMIN — PROPOFOL 50 MG: 10 INJECTION, EMULSION INTRAVENOUS at 07:40

## 2025-07-18 RX ADMIN — PROPOFOL 50 MG: 10 INJECTION, EMULSION INTRAVENOUS at 07:37

## 2025-07-18 RX ADMIN — PROPOFOL 50 MG: 10 INJECTION, EMULSION INTRAVENOUS at 07:43

## 2025-07-18 RX ADMIN — SODIUM CHLORIDE, POTASSIUM CHLORIDE, SODIUM LACTATE AND CALCIUM CHLORIDE: 600; 310; 30; 20 INJECTION, SOLUTION INTRAVENOUS at 07:38

## 2025-07-18 ASSESSMENT — PAIN SCALES - GENERAL
PAINLEVEL_OUTOF10: 0

## 2025-07-18 NOTE — PERIOP NOTE
0816  Dr. Avila at bedside.  Patient heart rate lower than baseline consistently post surgery.  Per Dr. Avila, patient ok for discharge.    I have reviewed discharge instructions with the  patient.  The  patient verbalized understanding. All questions addressed at this time. A paper copy of these instructions have been given to the patient to take home.

## 2025-07-18 NOTE — ANESTHESIA POSTPROCEDURE EVALUATION
Department of Anesthesiology  Postprocedure Note    Patient: Gail Bailon  MRN: [id number]  YOB: 1960  Date of evaluation: 7/18/2025    Procedure Summary       Date: 07/18/25 Room / Location: [facility] ASU  / [facility] AMBULATORY OR    Anesthesia Start: 0730 Anesthesia Stop: 0746    Procedure: COLONOSCOPY (Lower GI Region) Diagnosis:       Screening for colon cancer      (Screening for colon cancer [Z12.11])    Surgeons: Kamala Mcnamara Jr., MD Responsible Provider: Carlos Avila MD    Anesthesia Type: MAC ASA Status: 2            Anesthesia Type: No value filed.    Sharlene Phase I: Sharlene Score: 10    Sharlene Phase II:      Anesthesia Post Evaluation    Patient location during evaluation: PACU  Patient participation: complete - patient participated  Level of consciousness: awake  Airway patency: patent  Nausea & Vomiting: no nausea  Cardiovascular status: hemodynamically stable  Respiratory status: acceptable  Hydration status: stable  Pain management: adequate    No notable events documented.

## 2025-07-18 NOTE — H&P
Have you been to the ER, urgent care clinic since your last visit?  Hospitalized since your last visit?   YES - When: approximately 6 days ago.  Where and Why: Sick visit.    Have you seen or consulted any other health care providers outside our system since your last visit?   YES - When: approximately 6 days ago.  [facility]      “Have you had a colorectal cancer screening such as a colonoscopy/FIT/Cologuard?    YES - Type: Cologuard 2020    No colonoscopy on file  No cologuard on file  Date of last FIT: 8/30/2020   No flexible sigmoidoscopy on file        [facility]   [address], [address]  [place] [address]  P: [phone]  F: [phone]    SUBJECTIVE   HPI:    Ms. Gail Bailon is a 64 y.o. female who presents for a 1 month blood pressure evaluation follow up visit.  The patient is also complaining of new onset migraines which she has been experiencing on and off for two days.   She has taken Excedrin but it has not worked.  She does endorse slight vision changes when they occur.  She was treated earlier this month 4/2/2025 with Tamiflu for positive influenza.  She reports feeling better but is still experiencing some congestion, cough and postnasal drip.      PMH:   Past Medical History:   Diagnosis Date    Asthma     Calculus of kidney     Hematuria 07/22/2021    Migraines     Multiple pulmonary nodules 2018    Dr. Jin pulmonologist    Other and unspecified hyperlipidemia 05/2009    high CRP    Unspecified essential hypertension      PSH:  has a past surgical history that includes Colonoscopy (2013) and Total abdominal hysterectomy w/ bilateral salpingoophorectomy (2005).  All: is allergic to penicillins and sulfamethoxazole-trimethoprim.   MEDS:   Current Outpatient Medications   Medication Sig    butalbital-acetaminophen-caffeine (FIORICET, ESGIC) -40 MG per tablet Take 1 tablet by mouth every 4 hours as needed for Headaches    atorvastatin (LIPITOR) 20

## 2025-07-18 NOTE — OP NOTE
Colonoscopy Procedure Note      Indications:    Screening colonoscopy     :  ZACHARY HORAN JR, MD    Staff: Circulator: Willard Parker RN  Scrub Person First: Valente Baez RN  Circulator Assist: Taylor Blood RN     Implants: none    Referring Provider: Jonah Garcia FNP    Sedation: MAC    Procedure Details:  After informed consent was obtained with all risks and benefits of procedure explained and preoperative exam completed, the patient was taken to the endoscopy suite and placed in the left lateral decubitus position.  Upon sequential sedation as per above, a digital rectal exam was performed  And was normal.  The Olympus videocolonoscope  was inserted in the rectum and carefully advanced to the cecum, which was identified by the ileocecal valve and appendiceal orifice.  The quality of preparation was excellent.  The colonoscope was slowly withdrawn with careful evaluation between folds. Retroflexion in the rectum was performed and was normal..     Colon Findings:   Rectum: Grade 2 internal hemorrhoid(s);  Sigmoid: normal  Descending Colon: normal  Transverse Colon: normal  Ascending Colon: normal  Cecum: normal    Complications:   None; patient tolerated the procedure well.           Impression:    See Postoperative diagnosis above    Recommendations:  -Repeat colonoscopy in 5 years due to history of polyps.   Resume normal medication(s).     Interventions:  none    Complications: None.     Specimen Removed:  * No specimens in log *    EBL:  None.    Discharge Disposition:  Home in the company of a  when able to ambulate.    ZACHARY HORAN JR, MD  7/18/2025  7:48 AM

## 2025-07-18 NOTE — DISCHARGE INSTRUCTIONS
Gail Bailon  [id number]  1960    COLONOSCOPY DISCHARGE INSTRUCTIONS    DISCOMFORT:  Redness at IV site- apply warm compress to area; if redness or soreness persist- contact your physician  There may be a slight amount of blood passed from the rectum  Gaseous discomfort- walking, belching will help relieve any discomfort    DIET:   Regular diet, however, remember your colon is empty and a heavy meal will produce gas.  Avoid these foods:  vegetables, fried / greasy foods, carbonated drinks for today  You may not drink alcoholic beverages for at least 12 hours       ACTIVITY:  You may not operate a vehicle for 12 hours  You may not engage in an occupation involving machinery or appliances for rest of today  You may then resume your normal daily activities it is recommended that you spend the remainder of the day resting -  avoid any strenuous activity.  Avoid making any critical decisions for at least 24 hour    CALL M.D.  ANY SIGN OF:   Increasing pain, nausea, vomiting  Abdominal distension (swelling)  New increased bleeding (oral or rectal)  Fever (chills)  Pain in chest area  Bloody discharge from nose or mouth  Shortness of breath     Follow-up Instructions:   Call Dr. Mcnamara for any questions or concerns.   Telephone # [phone]  Biopsy results will be available in  5 to 7 days      Impression:  normal colonoscopy except for hemorrhoids.  Repeat in 5 years due to history of polyps.

## 2025-07-26 DIAGNOSIS — J30.2 SEASONAL ALLERGIES: ICD-10-CM

## 2025-07-28 RX ORDER — MONTELUKAST SODIUM 10 MG/1
10 TABLET ORAL DAILY
Qty: 90 TABLET | Refills: 3 | OUTPATIENT
Start: 2025-07-28

## 2025-08-17 PROBLEM — Z12.11 SCREENING FOR COLON CANCER: Status: RESOLVED | Noted: 2025-03-25 | Resolved: 2025-08-17

## 2025-08-21 DIAGNOSIS — G43.909 ACUTE MIGRAINE: ICD-10-CM

## 2025-08-21 RX ORDER — BUTALBITAL, ACETAMINOPHEN AND CAFFEINE 50; 325; 40 MG/1; MG/1; MG/1
1 TABLET ORAL EVERY 4 HOURS PRN
Qty: 9 TABLET | Refills: 0 | Status: SHIPPED | OUTPATIENT
Start: 2025-08-21

## (undated) DEVICE — COLON KIT WITH 1.1 OZ ORCA HYDRA SEAL 2 GOWN

## (undated) DEVICE — TUBING IRRIG L10IN DISP PMP ENDOGATOR E